# Patient Record
Sex: MALE | Race: WHITE | Employment: UNEMPLOYED | URBAN - METROPOLITAN AREA
[De-identification: names, ages, dates, MRNs, and addresses within clinical notes are randomized per-mention and may not be internally consistent; named-entity substitution may affect disease eponyms.]

---

## 2018-08-22 NOTE — PROGRESS NOTES
SUBJECTIVE:   Giovany Garrett is a 27 year old male who presents to clinic today for the following health issues:    Patient here for refill of Remeron 15mg, given per treatment center. No record of medication in reconciliation.  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 Been clean for 50 days.   History of anxiety. See's counseling currently. They recommend that he get back on Remeron for sleep and anxiety. Was helping.     Problem list and histories reviewed & adjusted, as indicated.  Additional history: as documented    Patient Active Problem List   Diagnosis     Seasonal allergies     Anxiety     Past Surgical History:   Procedure Laterality Date     HERNIA REPAIR  2007       Social History   Substance Use Topics     Smoking status: Former Smoker     Types: Cigarettes     Smokeless tobacco: Never Used     Alcohol use No     Family History   Problem Relation Age of Onset     Substance Abuse Father          Current Outpatient Prescriptions   Medication Sig Dispense Refill     mirtazapine (REMERON) 15 MG tablet Take 1 tablet (15 mg) by mouth At Bedtime 90 tablet 1     cetirizine (ZYRTEC) 10 MG tablet Take 1 tablet by mouth every evening. (Patient not taking: Reported on 8/23/2018) 30 tablet 1     ibuprofen (ADVIL,MOTRIN) 200 MG tablet Take 200-400 mg by mouth every 6 hours as needed.       oxyCODONE (ROXICODONE) 15 MG immediate release tablet Take 1 tablet (15 mg) by mouth every 6 hours as needed for pain (Patient not taking: Reported on 8/23/2018) 20 tablet 0     No Known Allergies  BP Readings from Last 3 Encounters:   08/23/18 116/70   11/20/16 129/86   11/17/16 128/78    Wt Readings from Last 3 Encounters:   08/23/18 205 lb (93 kg)   05/24/12 150 lb (68 kg)                    Reviewed and updated as needed this visit by clinical staff  Tobacco  Allergies  Meds       Reviewed and updated as needed this visit by Provider         ROS:  Constitutional, HEENT, cardiovascular, pulmonary, gi and gu systems are  negative, except as otherwise noted.    OBJECTIVE:     /70  Pulse 75  Temp 98.2  F (36.8  C) (Oral)  Wt 205 lb (93 kg)  SpO2 98%  BMI 29.25 kg/m2  Body mass index is 29.25 kg/(m^2).  GENERAL: healthy, alert and no distress  PSYCH: mentation appears normal, affect normal/bright    Diagnostic Test Results:  none     ASSESSMENT/PLAN:          ICD-10-CM    1. Anxiety F41.9 mirtazapine (REMERON) 15 MG tablet     MENTAL HEALTH REFERRAL  - Adult; Psychiatry and Medication Management; Psychiatry; Other: [facility] [phone]; We will contact you to schedule the appointment or please call with any questions   2. History of drug abuse Z87.898 MENTAL HEALTH REFERRAL  - Adult; Psychiatry and Medication Management; Psychiatry; Other: [facility] [phone]; We will contact you to schedule the appointment or please call with any questions   ok for refills  Cont counseling  Recommend follow up with psychiatry   Follow up  6 months    Dwain Ann PA-C  [facility]

## 2018-08-23 ENCOUNTER — OFFICE VISIT (OUTPATIENT)
Dept: FAMILY MEDICINE | Facility: CLINIC | Age: 28
End: 2018-08-23
Payer: MEDICAID

## 2018-08-23 VITALS
BODY MASS INDEX: 29.25 KG/M2 | OXYGEN SATURATION: 98 % | HEART RATE: 75 BPM | SYSTOLIC BLOOD PRESSURE: 116 MMHG | DIASTOLIC BLOOD PRESSURE: 70 MMHG | TEMPERATURE: 98.2 F | WEIGHT: 205 LBS

## 2018-08-23 DIAGNOSIS — F19.11 HISTORY OF DRUG ABUSE (H): ICD-10-CM

## 2018-08-23 DIAGNOSIS — F41.9 ANXIETY: Primary | ICD-10-CM

## 2018-08-23 PROCEDURE — 99203 OFFICE O/P NEW LOW 30 MIN: CPT | Performed by: PHYSICIAN ASSISTANT

## 2018-08-23 RX ORDER — MIRTAZAPINE 15 MG/1
15 TABLET, FILM COATED ORAL AT BEDTIME
Qty: 90 TABLET | Refills: 1 | Status: SHIPPED | OUTPATIENT
Start: 2018-08-23 | End: 2022-02-16

## 2018-08-23 ASSESSMENT — ANXIETY QUESTIONNAIRES
GAD7 TOTAL SCORE: 7
3. WORRYING TOO MUCH ABOUT DIFFERENT THINGS: SEVERAL DAYS
7. FEELING AFRAID AS IF SOMETHING AWFUL MIGHT HAPPEN: NOT AT ALL
6. BECOMING EASILY ANNOYED OR IRRITABLE: SEVERAL DAYS
2. NOT BEING ABLE TO STOP OR CONTROL WORRYING: SEVERAL DAYS
5. BEING SO RESTLESS THAT IT IS HARD TO SIT STILL: MORE THAN HALF THE DAYS
1. FEELING NERVOUS, ANXIOUS, OR ON EDGE: SEVERAL DAYS

## 2018-08-23 ASSESSMENT — PATIENT HEALTH QUESTIONNAIRE - PHQ9: 5. POOR APPETITE OR OVEREATING: SEVERAL DAYS

## 2018-08-23 NOTE — MR AVS SNAPSHOT
After Visit Summary   8/23/2018    Giovany Garrett    MRN: [id number]           Patient Information     Date Of Birth          1990        Visit Information        Provider Department      8/23/2018 7:20 AM Dwain Ann PA-C [facility]        Today's Diagnoses     Anxiety    -  1    History of drug abuse           Follow-ups after your visit        Additional Services     MENTAL HEALTH REFERRAL  - Adult; Psychiatry and Medication Management; Psychiatry; Other: [facility] [phone]; We will contact you to schedule the appointment or please call with any questions       All scheduling is subject to the client's specific insurance plan & benefits, provider/location availability, and provider clinical specialities.  Please arrive 15 minutes early for your first appointment and bring your completed paperwork.    Please be aware that coverage of these services is subject to the terms and limitations of your health insurance plan.  Call member services at your health plan with any benefit or coverage questions.                            Follow-up notes from your care team     Return in about 6 months (around 2/23/2019).      Who to contact     If you have questions or need follow up information about today's clinic visit or your schedule please contact [facility] directly at [phone].  Normal or non-critical lab and imaging results will be communicated to you by MyChart, letter or phone within 4 business days after the clinic has received the results. If you do not hear from us within 7 days, please contact the clinic through MyChart or phone. If you have a critical or abnormal lab result, we will notify you by phone as soon as possible.  Submit refill requests through [employer] or call your pharmacy and they will forward the refill request to us. Please allow 3 business days for your refill to be completed.          Additional Information  About Your Visit        Care EveryWhere ID     This is your Care EveryWhere ID. This could be used by other organizations to access your [place] medical records  [id number]        Your Vitals Were     Pulse Temperature Pulse Oximetry BMI (Body Mass Index)          75 98.2  F (36.8  C) (Oral) 98% 29.25 kg/m2         Blood Pressure from Last 3 Encounters:   08/23/18 116/70   11/20/16 129/86   11/17/16 128/78    Weight from Last 3 Encounters:   08/23/18 205 lb (93 kg)   05/24/12 150 lb (68 kg)              We Performed the Following     MENTAL HEALTH REFERRAL  - Adult; Psychiatry and Medication Management; Psychiatry; Other: [facility] [phone]; We will contact you to schedule the appointment or please call with any questions          Today's Medication Changes          These changes are accurate as of 8/23/18  7:29 AM.  If you have any questions, ask your nurse or doctor.               Start taking these medicines.        Dose/Directions    mirtazapine 15 MG tablet   Commonly known as:  REMERON   Used for:  Anxiety   Started by:  Dwain Ann PA-C        Dose:  15 mg   Take 1 tablet (15 mg) by mouth At Bedtime   Quantity:  90 tablet   Refills:  1            Where to get your medicines      Some of these will need a paper prescription and others can be bought over the counter.  Ask your nurse if you have questions.     Bring a paper prescription for each of these medications     mirtazapine 15 MG tablet                Primary Care Provider Office Phone # Fax #    [facility] [phone] [id number] [facility] [address]        Equal Access to Services     MIGEL RAMIREZ AH: Hadelijah noguera Sokannan, waaxda luqadaha, qaybta kaalmada elidia jennings. So [facility] [phone].    ATENCIÓN: Si habla español, tiene a mcpherson disposición servicios gratuitos de asistencia lingüística. Llame al [phone].    We comply  with applicable federal civil rights laws and [place] laws. We do not discriminate on the basis of race, color, national origin, age, disability, sex, sexual orientation, or gender identity.            Thank you!     Thank you for choosing [facility]  [facility]. Our goal is always to provide you with excellent care. Hearing back from our patients is one way we can continue to improve our services. Please take a few minutes to complete the written survey that you may receive in the mail after your visit with us. Thank you!             Your Updated Medication List - Protect others around you: Learn how to safely use, store and throw away your medicines at www.disposemymeds.org.          This list is accurate as of 8/23/18  7:29 AM.  Always use your most recent med list.                   Brand Name Dispense Instructions for use Diagnosis    cetirizine 10 MG tablet    zyrTEC    30 tablet    Take 1 tablet by mouth every evening.    Seasonal allergies       ibuprofen 200 MG tablet    ADVIL/MOTRIN     Take 200-400 mg by mouth every 6 hours as needed.        mirtazapine 15 MG tablet    REMERON    90 tablet    Take 1 tablet (15 mg) by mouth At Bedtime    Anxiety       oxyCODONE IR 15 MG tablet    ROXICODONE    20 tablet    Take 1 tablet (15 mg) by mouth every 6 hours as needed for pain

## 2018-08-24 ASSESSMENT — PATIENT HEALTH QUESTIONNAIRE - PHQ9: SUM OF ALL RESPONSES TO PHQ QUESTIONS 1-9: 9

## 2018-08-24 ASSESSMENT — ANXIETY QUESTIONNAIRES: GAD7 TOTAL SCORE: 7

## 2018-09-24 NOTE — PROGRESS NOTES
SUBJECTIVE:   CC: Giovany Garrett is an 27 year old male who presents for preventative health visit.     Answers for HPI/ROS submitted by the patient on 9/25/2018   Annual Exam:  Getting at least 3 servings of Calcium per day:: Yes  Bi-annual eye exam:: NO  Dental care twice a year:: NO  Sleep apnea or symptoms of sleep apnea:: None  Diet:: Regular (no restrictions)  Frequency of exercise:: 2-3 days/week  Positive for the following: rash  Negative for the following: abdominal pain, Blood in stool, Blood in urine, chest pain, chills, congestion, constipation, cough, diarrhea, dizziness, ear pain, eye pain, nervous/anxious, fever, frequency, genital sores, headaches, hearing loss, heartburn, arthralgias, joint swelling, peripheral edema, mood changes, myalgias, nausea, dysuria, palpitations, Skin sensation changes, sore throat, urgency, shortness of breath, visual disturbance, weakness  impotence: No  penile discharge: No  Taking medications regularly:: Yes  Medication side effects:: None  Additional concerns today:: No  PHQ-2 Score: 1  Duration of exercise:: 30-45 minutes      Rash      Duration: 2 weeks    Description  Location: Groin Area  Itching: mild    Intensity:  mild    Accompanying signs and symptoms: None    History (similar episodes/previous evaluation): None    Precipitating or alleviating factors:  New exposures:  Uses the transformation house laundry detergent   Recent travel: no      Therapies tried and outcome: none    Also infertility questions. He and his fiance haven't been able to get pregnant for over 6 months. She has 2 kids. He is interested in getting any testing done to evaluate this problem.      Today's PHQ-2 Score:   PHQ-2 ( 1999 Pfizer) 9/25/2018 5/30/2012   Q1: Little interest or pleasure in doing things 0 2   Q2: Feeling down, depressed or hopeless 1 2   PHQ-2 Score 1 4   Q1: Little interest or pleasure in doing things Not at all -   Q2: Feeling down, depressed or hopeless Several days -    PHQ-2 Score 1 -       Abuse: Current or Past(Physical, Sexual or Emotional)- No  Do you feel safe in your environment - Yes    Social History   Substance Use Topics     Smoking status: Former Smoker     Types: Cigarettes     Smokeless tobacco: Never Used     Alcohol use No      If you drink alcohol do you typically have >3 drinks per day or >7 drinks per week? No                      Last PSA: No results found for: PSA    Reviewed orders with patient. Reviewed health maintenance and updated orders accordingly - Yes  Labs reviewed in EPIC  BP Readings from Last 3 Encounters:   09/25/18 127/77   08/23/18 116/70   11/20/16 129/86    Wt Readings from Last 3 Encounters:   09/25/18 208 lb (94.3 kg)   08/23/18 205 lb (93 kg)   05/24/12 150 lb (68 kg)                  Patient Active Problem List   Diagnosis     Seasonal allergies     Anxiety     Past Surgical History:   Procedure Laterality Date     HERNIA REPAIR  2007       Social History   Substance Use Topics     Smoking status: Former Smoker     Types: Cigarettes     Smokeless tobacco: Never Used     Alcohol use No     Family History   Problem Relation Age of Onset     Substance Abuse Father          Current Outpatient Prescriptions   Medication Sig Dispense Refill     clotrimazole (LOTRIMIN) 1 % cream Apply topically 2 times daily 15 g 1     ibuprofen (ADVIL,MOTRIN) 200 MG tablet Take 200-400 mg by mouth every 6 hours as needed.       mirtazapine (REMERON) 15 MG tablet Take 1 tablet (15 mg) by mouth At Bedtime 90 tablet 1     No Known Allergies    Reviewed and updated as needed this visit by clinical staff  Tobacco  Allergies  Meds  Problems  Med Hx  Surg Hx  Fam Hx  Soc Hx          Reviewed and updated as needed this visit by Provider  Allergies  Meds  Problems          Past Medical History:   Diagnosis Date     Leaky heart valve       Past Surgical History:   Procedure Laterality Date     HERNIA REPAIR  2007       ROS:  CONSTITUTIONAL: NEGATIVE for fever,  "chills, change in weight  INTEGUMENTARY/SKIN: NEGATIVE for worrisome rashes, moles or lesions  EYES: NEGATIVE for vision changes or irritation  ENT: NEGATIVE for ear, mouth and throat problems  RESP: NEGATIVE for significant cough or SOB  CV: NEGATIVE for chest pain, palpitations or peripheral edema  GI: NEGATIVE for nausea, abdominal pain, heartburn, or change in bowel habits   male: negative for dysuria, hematuria, decreased urinary stream, erectile dysfunction, urethral discharge  MUSCULOSKELETAL: NEGATIVE for significant arthralgias or myalgia  NEURO: NEGATIVE for weakness, dizziness or paresthesias  PSYCHIATRIC: NEGATIVE for changes in mood or affect    OBJECTIVE:   /77  Pulse 85  Temp 97.7  F (36.5  C) (Oral)  Ht 5' 11\" (1.803 m)  Wt 208 lb (94.3 kg)  SpO2 100%  BMI 29.01 kg/m2  EXAM:  GENERAL: healthy, alert and no distress  EYES: Eyes grossly normal to inspection, PERRL and conjunctivae and sclerae normal  HENT: ear canals and TM's normal, nose and mouth without ulcers or lesions  NECK: no adenopathy, no asymmetry, masses, or scars and thyroid normal to palpation  RESP: lungs clear to auscultation - no rales, rhonchi or wheezes  CV: regular rate and rhythm, normal S1 S2, no S3 or S4, no murmur, click or rub, no peripheral edema and peripheral pulses strong  ABDOMEN: soft, nontender, no hepatosplenomegaly, no masses and bowel sounds normal   (male): normal male genitalia without lesions or urethral discharge, no hernia  MS: no gross musculoskeletal defects noted, no edema  SKIN: 4cm circular dry erythmatous patch right inner superior thigh.  NEURO: Normal strength and tone, mentation intact and speech normal  PSYCH: mentation appears normal, affect normal/bright    Diagnostic Test Results:  Results for orders placed or performed in visit on 09/25/18 (from the past 24 hour(s))   KOH skin hair or nails   Result Value Ref Range    Specimen Description Thigh Right     KOH Skin Hair Nails Test " "Fungal elements seen (A)        ASSESSMENT/PLAN:       ICD-10-CM    1. Routine general medical examination at a health care facility Z00.00 Lipid panel reflex to direct LDL Fasting     Basic metabolic panel   2. Rash R21 KOH skin hair or nails   3. Tinea corporis B35.4 clotrimazole (LOTRIMIN) 1 % cream   4. Fertility testing Z31.41 Semen Analysis, Strict Morphology (KWESI)   5. Need for prophylactic vaccination and inoculation against influenza Z23 FLU VACCINE, SPLIT VIRUS, IM (QUADRIVALENT) [95398]- >3 YRS     Vaccine Administration, Initial [11804]   1. Work on Healthy diet and exercise. Getting heart rate elevated for 30 mins most days of week. Laps pending  2-3 start clotrimazole twice a day for 4 wks. See Patient Instructions   4. Future lab order placed.     COUNSELING:  Reviewed preventive health counseling, as reflected in patient instructions       Regular exercise       Healthy diet/nutrition    BP Readings from Last 1 Encounters:   09/25/18 127/77     Estimated body mass index is 29.01 kg/(m^2) as calculated from the following:    Height as of this encounter: 5' 11\" (1.803 m).    Weight as of this encounter: 208 lb (94.3 kg).      Weight management plan: Discussed healthy diet and exercise guidelines and patient will follow up in 12 months in clinic to re-evaluate.     reports that he has quit smoking. His smoking use included Cigarettes. He has never used smokeless tobacco.      Counseling Resources:  ATP IV Guidelines  Pooled Cohorts Equation Calculator  FRAX Risk Assessment  ICSI Preventive Guidelines  Dietary Guidelines for Americans, 2010  USDA's MyPlate  ASA Prophylaxis  Lung CA Screening    Dwain Ann PA-C  [facility]  "

## 2018-09-24 NOTE — PATIENT INSTRUCTIONS
Preventive Health Recommendations  Male Ages 26 - 39    Yearly exam:             See your health care provider every year in order to  o   Review health changes.   o   Discuss preventive care.    o   Review your medicines if your doctor has prescribed any.    You should be tested each year for STDs (sexually transmitted diseases), if you re at risk.     After age 35, talk to your provider about cholesterol testing. If you are at risk for heart disease, have your cholesterol tested at least every 5 years.     If you are at risk for diabetes, you should have a diabetes test (fasting glucose).  Shots: Get a flu shot each year. Get a tetanus shot every 10 years.     Nutrition:    Eat at least 5 servings of fruits and vegetables daily.     Eat whole-grain bread, whole-wheat pasta and brown rice instead of white grains and rice.     Get adequate Calcium and Vitamin D.     Lifestyle    Exercise for at least 150 minutes a week (30 minutes a day, 5 days a week). This will help you control your weight and prevent disease.     Limit alcohol to one drink per day.     No smoking.     Wear sunscreen to prevent skin cancer.     See your dentist every six months for an exam and cleaning.                    Ringworm (Tinea Corporis)  What is ringworm?   Ringworm is a fungus infection of the skin. It has nothing to do with worms. People often get it from puppies or kittens who have ringworm. It can also be passed from person to person following close contact such as wrestling.  If your child has ringworm, your child will have a ring-shaped pink patch on the skin. The patch will:  Usually be 1/2 to 1 inch in size with a scaly, raised border and clear center.   Get slowly bigger.   Be mildly itchy.  How long does it last?   With the appropriate treatment, ringworm should go away in 2 weeks.  How can I take care of my child?   Antifungal creamBuy Tinactin, Micatin, or Lotrimin cream at your drugstore. You won't need a prescription.  "Apply the cream twice a day to the rash and 1 inch beyond its borders. Continue this treatment for 1 week after the ringworm patch is smooth and seems to be gone. Encourage your child to avoid scratching the area.   ContagiousnessRingworm of the skin is mildly contagious. It requires direct skin-to-skin contact. After 48 hours of treatment, ringworm is not contagious at all. Your child doesn't have to miss any school or day care. The type of ringworm you get from pets is not spread from human to human, only from animal to human.   Treatment of petsKittens and puppies with ringworm usually do not itch and may not have any rash. Pets with a skin rash or sores should be examined by a . Also have your child avoid close contact with the animal until he is treated. Natural immunity develops in animals after 4 months even without treatment. Call your  for other questions.  When should I call my child's healthcare provider?  Call during office hours if:  The ringworm continues to spread after 1 week of treatment.   The rash has not cleared up in 4 weeks.   You have other concerns or questions.    Published by [employer].  This content is reviewed periodically and is subject to change as new health information becomes available. The information is intended to inform and educate and is not a replacement for medical evaluation, advice, diagnosis or treatment by a healthcare professional.  Written by JULIÁN Fabian MD, author of \"Your Child's Health,\" [employer].    2011 [employer] and/or its affiliates. All rights reserved.               "

## 2018-09-25 ENCOUNTER — OFFICE VISIT (OUTPATIENT)
Dept: FAMILY MEDICINE | Facility: CLINIC | Age: 28
End: 2018-09-25
Payer: COMMERCIAL

## 2018-09-25 VITALS
DIASTOLIC BLOOD PRESSURE: 77 MMHG | SYSTOLIC BLOOD PRESSURE: 127 MMHG | TEMPERATURE: 97.7 F | BODY MASS INDEX: 29.12 KG/M2 | WEIGHT: 208 LBS | HEART RATE: 85 BPM | HEIGHT: 71 IN | OXYGEN SATURATION: 100 %

## 2018-09-25 DIAGNOSIS — Z31.41 FERTILITY TESTING: ICD-10-CM

## 2018-09-25 DIAGNOSIS — B35.4 TINEA CORPORIS: ICD-10-CM

## 2018-09-25 DIAGNOSIS — Z00.00 ROUTINE GENERAL MEDICAL EXAMINATION AT A HEALTH CARE FACILITY: Primary | ICD-10-CM

## 2018-09-25 DIAGNOSIS — R21 RASH: ICD-10-CM

## 2018-09-25 DIAGNOSIS — Z23 NEED FOR PROPHYLACTIC VACCINATION AND INOCULATION AGAINST INFLUENZA: ICD-10-CM

## 2018-09-25 LAB
ANION GAP SERPL CALCULATED.3IONS-SCNC: 8 MMOL/L (ref 3–14)
BUN SERPL-MCNC: 17 MG/DL (ref 7–30)
CALCIUM SERPL-MCNC: 8.7 MG/DL (ref 8.5–10.1)
CHLORIDE SERPL-SCNC: 104 MMOL/L (ref 94–109)
CHOLEST SERPL-MCNC: 173 MG/DL
CO2 SERPL-SCNC: 26 MMOL/L (ref 20–32)
CREAT SERPL-MCNC: 0.87 MG/DL (ref 0.66–1.25)
GFR SERPL CREATININE-BSD FRML MDRD: >90 ML/MIN/1.7M2
GLUCOSE SERPL-MCNC: 86 MG/DL (ref 70–99)
HDLC SERPL-MCNC: 40 MG/DL
KOH PREP SPEC: ABNORMAL
LDLC SERPL CALC-MCNC: 116 MG/DL
NONHDLC SERPL-MCNC: 133 MG/DL
POTASSIUM SERPL-SCNC: 4.3 MMOL/L (ref 3.4–5.3)
SODIUM SERPL-SCNC: 138 MMOL/L (ref 133–144)
SPECIMEN SOURCE: ABNORMAL
TRIGL SERPL-MCNC: 83 MG/DL

## 2018-09-25 PROCEDURE — 99213 OFFICE O/P EST LOW 20 MIN: CPT | Mod: 25 | Performed by: PHYSICIAN ASSISTANT

## 2018-09-25 PROCEDURE — 80061 LIPID PANEL: CPT | Performed by: PHYSICIAN ASSISTANT

## 2018-09-25 PROCEDURE — 36415 COLL VENOUS BLD VENIPUNCTURE: CPT | Performed by: PHYSICIAN ASSISTANT

## 2018-09-25 PROCEDURE — 87220 TISSUE EXAM FOR FUNGI: CPT | Performed by: PHYSICIAN ASSISTANT

## 2018-09-25 PROCEDURE — 99395 PREV VISIT EST AGE 18-39: CPT | Mod: 25 | Performed by: PHYSICIAN ASSISTANT

## 2018-09-25 PROCEDURE — 90471 IMMUNIZATION ADMIN: CPT | Performed by: PHYSICIAN ASSISTANT

## 2018-09-25 PROCEDURE — 90686 IIV4 VACC NO PRSV 0.5 ML IM: CPT | Performed by: PHYSICIAN ASSISTANT

## 2018-09-25 PROCEDURE — 80048 BASIC METABOLIC PNL TOTAL CA: CPT | Performed by: PHYSICIAN ASSISTANT

## 2018-09-25 RX ORDER — CLOTRIMAZOLE 1 %
CREAM (GRAM) TOPICAL 2 TIMES DAILY
Qty: 15 G | Refills: 1 | Status: SHIPPED | OUTPATIENT
Start: 2018-09-25 | End: 2022-02-16

## 2018-09-25 ASSESSMENT — ENCOUNTER SYMPTOMS
ARTHRALGIAS: 0
COUGH: 0
CONSTIPATION: 0
DYSURIA: 0
DIARRHEA: 0
FEVER: 0
PALPITATIONS: 0
MYALGIAS: 0
SORE THROAT: 0
CHILLS: 0
ABDOMINAL PAIN: 0
DIZZINESS: 0
NAUSEA: 0
WEAKNESS: 0
HEMATOCHEZIA: 0
PARESTHESIAS: 0
HEADACHES: 0
JOINT SWELLING: 0
EYE PAIN: 0
FREQUENCY: 0
SHORTNESS OF BREATH: 0
NERVOUS/ANXIOUS: 0
HEMATURIA: 0
HEARTBURN: 0

## 2018-09-25 NOTE — MR AVS SNAPSHOT
After Visit Summary   9/25/2018    Giovany Garrett    MRN: [id number]           Patient Information     Date Of Birth          1990        Visit Information        Provider Department      9/25/2018 11:20 AM Dwain Ann PA-C [facility]        Today's Diagnoses     Routine general medical examination at a health care facility    -  1    Rash        Fertility testing        Tinea corporis          Care Instructions      Preventive Health Recommendations  Male Ages 26 - 39    Yearly exam:             See your health care provider every year in order to  o   Review health changes.   o   Discuss preventive care.    o   Review your medicines if your doctor has prescribed any.    You should be tested each year for STDs (sexually transmitted diseases), if you re at risk.     After age 35, talk to your provider about cholesterol testing. If you are at risk for heart disease, have your cholesterol tested at least every 5 years.     If you are at risk for diabetes, you should have a diabetes test (fasting glucose).  Shots: Get a flu shot each year. Get a tetanus shot every 10 years.     Nutrition:    Eat at least 5 servings of fruits and vegetables daily.     Eat whole-grain bread, whole-wheat pasta and brown rice instead of white grains and rice.     Get adequate Calcium and Vitamin D.     Lifestyle    Exercise for at least 150 minutes a week (30 minutes a day, 5 days a week). This will help you control your weight and prevent disease.     Limit alcohol to one drink per day.     No smoking.     Wear sunscreen to prevent skin cancer.     See your dentist every six months for an exam and cleaning.                    Ringworm (Tinea Corporis)  What is ringworm?   Ringworm is a fungus infection of the skin. It has nothing to do with worms. People often get it from puppies or kittens who have ringworm. It can also be passed from person to person following close contact such as wrestling.  If  "your child has ringworm, your child will have a ring-shaped pink patch on the skin. The patch will:  Usually be 1/2 to 1 inch in size with a scaly, raised border and clear center.   Get slowly bigger.   Be mildly itchy.  How long does it last?   With the appropriate treatment, ringworm should go away in 2 weeks.  How can I take care of my child?   Antifungal creamBuy Tinactin, Micatin, or Lotrimin cream at your drugstore. You won't need a prescription. Apply the cream twice a day to the rash and 1 inch beyond its borders. Continue this treatment for 1 week after the ringworm patch is smooth and seems to be gone. Encourage your child to avoid scratching the area.   ContagiousnessRingworm of the skin is mildly contagious. It requires direct skin-to-skin contact. After 48 hours of treatment, ringworm is not contagious at all. Your child doesn't have to miss any school or day care. The type of ringworm you get from pets is not spread from human to human, only from animal to human.   Treatment of petsKittens and puppies with ringworm usually do not itch and may not have any rash. Pets with a skin rash or sores should be examined by a . Also have your child avoid close contact with the animal until he is treated. Natural immunity develops in animals after 4 months even without treatment. Call your  for other questions.  When should I call my child's healthcare provider?  Call during office hours if:  The ringworm continues to spread after 1 week of treatment.   The rash has not cleared up in 4 weeks.   You have other concerns or questions.    Published by [employer].  This content is reviewed periodically and is subject to change as new health information becomes available. The information is intended to inform and educate and is not a replacement for medical evaluation, advice, diagnosis or treatment by a healthcare professional.  Written by JULIÁN Fabian MD, author of \"Your Child's Health,\" " "[employer].    2011 [facility] and/or its affiliates. All rights reserved.                       Follow-ups after your visit        Your next 10 appointments already scheduled     Sep 25, 2018 11:20 AM CDT   PHYS[facility] with Dwain Ann PA-C   [facility]    [address] [facility] [address] [phone]              Future tests that were ordered for you today     Open Future Orders        Priority Expected Expires Ordered    Semen Analysis, Strict Morphology (KWESI) Routine  9/25/2019 9/25/2018            Who to contact     If you have questions or need follow up information about today's clinic visit or your schedule please contact [facility] directly at [phone].  Normal or non-critical lab and imaging results will be communicated to you by MyChart, letter or phone within 4 business days after the clinic has received the results. If you do not hear from us within 7 days, please contact the clinic through MyChart or phone. If you have a critical or abnormal lab result, we will notify you by phone as soon as possible.  Submit refill requests through [employer] or call your pharmacy and they will forward the refill request to us. Please allow 3 business days for your refill to be completed.          Additional Information About Your Visit        Care EveryWhere ID     This is your Care EveryWhere ID. This could be used by other organizations to access your [facility] medical records  [id number]        Your Vitals Were     Pulse Temperature Height Pulse Oximetry BMI (Body Mass Index)       85 97.7  F (36.5  C) (Oral) 5' 11\" (1.803 m) 100% 29.01 kg/m2        Blood Pressure from Last 3 Encounters:   09/25/18 127/77   08/23/18 116/70   11/20/16 129/86    Weight from Last 3 Encounters:   09/25/18 208 lb (94.3 kg)   08/23/18 205 lb (93 kg)   05/24/12 150 lb (68 kg)              We Performed the Following     Basic metabolic panel     KOH skin hair " or nails     Lipid panel reflex to direct LDL Fasting          Today's Medication Changes          These changes are accurate as of 9/25/18 11:17 AM.  If you have any questions, ask your nurse or doctor.               Start taking these medicines.        Dose/Directions    clotrimazole 1 % cream   Commonly known as:  LOTRIMIN   Used for:  Tinea corporis   Started by:  Dwain Ann PA-C        Apply topically 2 times daily   Quantity:  15 g   Refills:  1            Where to get your medicines      These medications [facility] to [facility] [address], [address]  [address] [address], [facility] [address]     Phone:  [phone]     clotrimazole 1 % cream                Primary Care Provider Office Phone # Fax #    [facility] [phone] [address] [facility] [address]        Equal Access to Services     FER RAMIREZ : Hadii niraj ku hadasho Soomaali, waaxda luqadaha, qaybta kaalmada adeegyada, waxay louin hayaan abe cruz . So [facility] [phone].    ATENCIÓN: Si habla español, tiene a mcpherson disposición servicios gratuitos de asistencia lingüística. Llame al [phone].    We comply with applicable federal civil rights laws and [place] laws. We do not discriminate on the basis of race, color, national origin, age, disability, sex, sexual orientation, or gender identity.            Thank you!     Thank you for choosing [facility]  for your care. Our goal is always to provide you with excellent care. Hearing back from our patients is one way we can continue to improve our services. Please take a few minutes to complete the written survey that you may receive in the mail after your visit with us. Thank you!             Your Updated Medication List - Protect others around you: Learn how to safely use, store and throw away your medicines at www.disposemymeds.org.          This list is accurate as of 9/25/18  11:17 AM.  Always use your most recent med list.                   Brand Name Dispense Instructions for use Diagnosis    clotrimazole 1 % cream    LOTRIMIN    15 g    Apply topically 2 times daily    Tinea corporis       ibuprofen 200 MG tablet    ADVIL/MOTRIN     Take 200-400 mg by mouth every 6 hours as needed.        mirtazapine 15 MG tablet    REMERON    90 tablet    Take 1 tablet (15 mg) by mouth At Bedtime    Anxiety

## 2018-09-25 NOTE — LETTER
September 26, 2018    Giovany Garrett  [address] [facility] [address]            Mr. Garrett,     All of your labs were normal for you.     Please contact the clinic if you have additional questions.  Thank you.     Sincerely,     Dwain Ann PA-C/michael    Results for orders placed or performed in visit on 09/25/18   Lipid panel reflex to direct LDL Fasting   Result Value Ref Range    Cholesterol 173 <200 mg/dL    Triglycerides 83 <150 mg/dL    HDL Cholesterol 40 >39 mg/dL    LDL Cholesterol Calculated 116 (H) <100 mg/dL    Non HDL Cholesterol 133 (H) <130 mg/dL   Basic metabolic panel   Result Value Ref Range    Sodium 138 133 - 144 mmol/L    Potassium 4.3 3.4 - 5.3 mmol/L    Chloride 104 94 - 109 mmol/L    Carbon Dioxide 26 20 - 32 mmol/L    Anion Gap 8 3 - 14 mmol/L    Glucose 86 70 - 99 mg/dL    Urea Nitrogen 17 7 - 30 mg/dL    Creatinine 0.87 0.66 - 1.25 mg/dL    GFR Estimate >90 >60 mL/min/1.7m2    GFR Estimate If Black >90 >60 mL/min/1.7m2    Calcium 8.7 8.5 - 10.1 mg/dL   KOH skin hair or nails   Result Value Ref Range    Specimen Description Thigh Right     KOH Skin Hair Nails Test Fungal elements seen (A)

## 2018-09-25 NOTE — PROGRESS NOTES

## 2018-09-26 NOTE — PROGRESS NOTES
MrYuval Garrett,    All of your labs were normal for you.    Please contact the clinic if you have additional questions.  Thank you.    Sincerely,    Dwain Ann PA-C

## 2019-04-25 ENCOUNTER — HOSPITAL ENCOUNTER (EMERGENCY)
Facility: CLINIC | Age: 29
Discharge: HOME | End: 2019-04-25
Attending: PHYSICIAN ASSISTANT | Admitting: PHYSICIAN ASSISTANT
Payer: COMMERCIAL

## 2019-04-25 VITALS
SYSTOLIC BLOOD PRESSURE: 147 MMHG | OXYGEN SATURATION: 98 % | TEMPERATURE: 98 F | DIASTOLIC BLOOD PRESSURE: 80 MMHG | HEART RATE: 100 BPM

## 2019-04-25 DIAGNOSIS — H60.392 OTHER INFECTIVE ACUTE OTITIS EXTERNA OF LEFT EAR: ICD-10-CM

## 2019-04-25 PROCEDURE — G0463 HOSPITAL OUTPT CLINIC VISIT: HCPCS

## 2019-04-25 PROCEDURE — 99213 OFFICE O/P EST LOW 20 MIN: CPT | Mod: Z6 | Performed by: PHYSICIAN ASSISTANT

## 2019-04-25 RX ORDER — CIPROFLOXACIN AND DEXAMETHASONE 3; 1 MG/ML; MG/ML
4 SUSPENSION/ DROPS AURICULAR (OTIC) 2 TIMES DAILY
Qty: 7.5 ML | Refills: 0 | Status: SHIPPED | OUTPATIENT
Start: 2019-04-25 | End: 2022-02-16

## 2019-04-25 RX ORDER — AMOXICILLIN 875 MG
875 TABLET ORAL 2 TIMES DAILY
Qty: 20 TABLET | Refills: 0 | Status: SHIPPED | OUTPATIENT
Start: 2019-04-25 | End: 2019-05-05

## 2019-04-25 NOTE — ED AVS SNAPSHOT
[facility]  [address] [address]  Phone:  [phone]  Fax:  [phone]                                    Giovany Garrett   MRN: [id number]    Department:  [facility] Department   Date of Visit:  4/25/2019           After Visit Summary Signature Page    I have received my discharge instructions, and my questions have been answered. I have discussed any challenges I see with this plan with the nurse or doctor.    ..........................................................................................................................................  Patient/Patient Representative Signature      ..........................................................................................................................................  Patient Representative Print Name and Relationship to Patient    ..................................................               ................................................  Date                                   Time    ..........................................................................................................................................  Reviewed by Signature/Title    ...................................................              ..............................................  Date                                               Time          22EPIC Rev 08/18

## 2021-12-23 ENCOUNTER — HOSPITAL ENCOUNTER (OUTPATIENT)
Dept: BEHAVIORAL HEALTH | Facility: CLINIC | Age: 31
Discharge: HOME | End: 2021-12-23
Attending: FAMILY MEDICINE | Admitting: FAMILY MEDICINE
Payer: COMMERCIAL

## 2021-12-23 VITALS — HEIGHT: 72 IN | BODY MASS INDEX: 20.32 KG/M2 | WEIGHT: 150 LBS

## 2021-12-23 DIAGNOSIS — F15.20 AMPHETAMINE USE DISORDER, SEVERE, DEPENDENCE (H): ICD-10-CM

## 2021-12-23 PROCEDURE — H0001 ALCOHOL AND/OR DRUG ASSESS: HCPCS | Mod: TEL,95

## 2021-12-23 ASSESSMENT — ANXIETY QUESTIONNAIRES
7. FEELING AFRAID AS IF SOMETHING AWFUL MIGHT HAPPEN: MORE THAN HALF THE DAYS
GAD7 TOTAL SCORE: 14
IF YOU CHECKED OFF ANY PROBLEMS ON THIS QUESTIONNAIRE, HOW DIFFICULT HAVE THESE PROBLEMS MADE IT FOR YOU TO DO YOUR WORK, TAKE CARE OF THINGS AT HOME, OR GET ALONG WITH OTHER PEOPLE: SOMEWHAT DIFFICULT
3. WORRYING TOO MUCH ABOUT DIFFERENT THINGS: MORE THAN HALF THE DAYS
4. TROUBLE RELAXING: MORE THAN HALF THE DAYS
5. BEING SO RESTLESS THAT IT IS HARD TO SIT STILL: SEVERAL DAYS
6. BECOMING EASILY ANNOYED OR IRRITABLE: MORE THAN HALF THE DAYS
2. NOT BEING ABLE TO STOP OR CONTROL WORRYING: MORE THAN HALF THE DAYS
1. FEELING NERVOUS, ANXIOUS, OR ON EDGE: NEARLY EVERY DAY

## 2021-12-23 ASSESSMENT — PAIN SCALES - GENERAL: PAINLEVEL: NO PAIN (0)

## 2021-12-23 ASSESSMENT — COLUMBIA-SUICIDE SEVERITY RATING SCALE - C-SSRS
1. IN THE PAST MONTH, HAVE YOU WISHED YOU WERE DEAD OR WISHED YOU COULD GO TO SLEEP AND NOT WAKE UP?: YES
2. HAVE YOU ACTUALLY HAD ANY THOUGHTS OF KILLING YOURSELF LIFETIME?: NO
2. HAVE YOU ACTUALLY HAD ANY THOUGHTS OF KILLING YOURSELF?: NO
1. IN THE PAST MONTH, HAVE YOU WISHED YOU WERE DEAD OR WISHED YOU COULD GO TO SLEEP AND NOT WAKE UP?: NO
ATTEMPT PAST THREE MONTHS: NO
ATTEMPT LIFETIME: NO

## 2021-12-23 ASSESSMENT — PATIENT HEALTH QUESTIONNAIRE - PHQ9: SUM OF ALL RESPONSES TO PHQ QUESTIONS 1-9: 14

## 2021-12-23 ASSESSMENT — MIFFLIN-ST. JEOR: SCORE: 1678.4
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
## 2021-12-24 ASSESSMENT — ANXIETY QUESTIONNAIRES: GAD7 TOTAL SCORE: 14

## 2022-02-04 ENCOUNTER — TELEPHONE (OUTPATIENT)
Dept: BEHAVIORAL HEALTH | Facility: CLINIC | Age: 32
End: 2022-02-04
Payer: COMMERCIAL

## 2022-02-08 ENCOUNTER — HOSPITAL ENCOUNTER (OUTPATIENT)
Dept: BEHAVIORAL HEALTH | Facility: CLINIC | Age: 32
Discharge: HOME | End: 2022-02-08
Attending: FAMILY MEDICINE | Admitting: FAMILY MEDICINE
Payer: COMMERCIAL

## 2022-02-08 VITALS — HEIGHT: 71 IN | BODY MASS INDEX: 22.4 KG/M2 | WEIGHT: 160 LBS

## 2022-02-08 PROCEDURE — H0001 ALCOHOL AND/OR DRUG ASSESS: HCPCS | Mod: TEL,95

## 2022-02-08 ASSESSMENT — MIFFLIN-ST. JEOR: SCORE: 1707.89

## 2022-02-08 ASSESSMENT — PAIN SCALES - GENERAL: PAINLEVEL: NO PAIN (0)

## 2022-02-08 NOTE — ADDENDUM NOTE
Encounter addended by: Penny Vazquez [facility] on: 2/8/2022 3:44 PM   Actions taken: Clinical Note Signed

## 2022-02-08 NOTE — PROGRESS NOTES
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 42 of the Code of Federal Regulations (CFR) Part 2: Confidentiality laws apply to this note and the information wherein.  Thus, this note cannot be copy and pasted into any other health care staff's note nor can it be included in general medical records sent to ANY outside agency without the patient's written consent.              DAANES Record has been saved.  Assessment ID: [id number]

## 2022-02-15 ENCOUNTER — HOSPITAL ENCOUNTER (OUTPATIENT)
Dept: BEHAVIORAL HEALTH | Facility: CLINIC | Age: 32
End: 2022-02-15
Attending: FAMILY MEDICINE
Payer: COMMERCIAL

## 2022-02-15 VITALS
HEART RATE: 99 BPM | WEIGHT: 158 LBS | BODY MASS INDEX: 22.62 KG/M2 | TEMPERATURE: 97.2 F | OXYGEN SATURATION: 97 % | HEIGHT: 70 IN | SYSTOLIC BLOOD PRESSURE: 135 MMHG | DIASTOLIC BLOOD PRESSURE: 83 MMHG | RESPIRATION RATE: 16 BRPM

## 2022-02-15 LAB — SARS-COV-2 RNA RESP QL NAA+PROBE: NEGATIVE

## 2022-02-15 PROCEDURE — 1002N00001 HC LODGING PLUS FACILITY CHARGE ADULT

## 2022-02-15 PROCEDURE — H2035 A/D TX PROGRAM, PER HOUR: HCPCS | Mod: HQ

## 2022-02-15 PROCEDURE — 87635 SARS-COV-2 COVID-19 AMP PRB: CPT | Performed by: FAMILY MEDICINE

## 2022-02-15 RX ORDER — LANOLIN ALCOHOL/MO/W.PET/CERES
3 CREAM (GRAM) TOPICAL
COMMUNITY
End: 2022-03-12

## 2022-02-15 RX ORDER — AMOXICILLIN 250 MG
2 CAPSULE ORAL DAILY PRN
COMMUNITY
End: 2022-03-12

## 2022-02-15 RX ORDER — LORATADINE 10 MG/1
10 TABLET ORAL DAILY PRN
COMMUNITY
End: 2022-03-12

## 2022-02-15 RX ORDER — ACETAMINOPHEN 325 MG/1
325-650 TABLET ORAL EVERY 4 HOURS PRN
COMMUNITY
End: 2022-03-12

## 2022-02-15 RX ORDER — MAGNESIUM HYDROXIDE/ALUMINUM HYDROXICE/SIMETHICONE 120; 1200; 1200 MG/30ML; MG/30ML; MG/30ML
30 SUSPENSION ORAL EVERY 6 HOURS PRN
COMMUNITY
End: 2022-03-12

## 2022-02-15 ASSESSMENT — ANXIETY QUESTIONNAIRES
7. FEELING AFRAID AS IF SOMETHING AWFUL MIGHT HAPPEN: SEVERAL DAYS
IF YOU CHECKED OFF ANY PROBLEMS ON THIS QUESTIONNAIRE, HOW DIFFICULT HAVE THESE PROBLEMS MADE IT FOR YOU TO DO YOUR WORK, TAKE CARE OF THINGS AT HOME, OR GET ALONG WITH OTHER PEOPLE: SOMEWHAT DIFFICULT
5. BEING SO RESTLESS THAT IT IS HARD TO SIT STILL: SEVERAL DAYS
4. TROUBLE RELAXING: SEVERAL DAYS
1. FEELING NERVOUS, ANXIOUS, OR ON EDGE: MORE THAN HALF THE DAYS
6. BECOMING EASILY ANNOYED OR IRRITABLE: MORE THAN HALF THE DAYS
3. WORRYING TOO MUCH ABOUT DIFFERENT THINGS: SEVERAL DAYS
GAD7 TOTAL SCORE: 9
2. NOT BEING ABLE TO STOP OR CONTROL WORRYING: SEVERAL DAYS

## 2022-02-15 ASSESSMENT — PATIENT HEALTH QUESTIONNAIRE - PHQ9: SUM OF ALL RESPONSES TO PHQ QUESTIONS 1-9: 10

## 2022-02-15 ASSESSMENT — MIFFLIN-ST. JEOR: SCORE: 1682.93

## 2022-02-15 NOTE — PROGRESS NOTES
Patient:  Giovany Garrett    Date: February 15, 2022    Comprehensive Assessment UPDATE       Comprehensive Summary Update and Review  Counselor met with patient on 2-15-22 and reviewed the Comprehensive Assessment.    There were no changes/updates identified by patient or in chart entries.

## 2022-02-15 NOTE — PROGRESS NOTES
Initial Services Plan    Before your first treatment group, please do the following    Immediate health & safety concerns: Look for sober housing and a supportive social network.  Look for a support network (such as AA, NA, DBT group, a Religion group, etc.)    Suggestions for client during the time between intake & completion of treatment plan:  Tour your treatment center (unit or outpatient clinic).  Introduce yourself to the treatment group.  Spend time getting to know your peers.  Complete the problem list for your treatment plan.  Start drug and alcohol use history.  Review your patient or client handbook.    Client issues to be addressed in the first treatment sessions:  Identify motivations(s) for coming to treatment, i.e. legal, family, job, self  Identify concerns about coming into treatment, i.e. fear of failing again, sharing a room in treatment  Identify concerns about going to group, i.e. fear of talking in group  Identify concerns about contacting boss, PO  Identify outside concerns that may interfere with treatment, i.e. bills not getting paid, homesick for children    Pamela Zafar, [facility]  2/15/2022

## 2022-02-15 NOTE — PROGRESS NOTES
Progress Note    This patient had a Comprehensive Substance Abuse assessment on 02/08/2022 completed by JOSTIN Sterling.  This patient was seen for a face to face update of the Comprehensive Substance Abuse assessment on 2/15/2022 by JOSTIN Guillen.  INSIDE: The patient's Comprehensive Substance Abuse assessment completed on 02/08/2022 is in the patient's electronic medical record in Epic in the Chart Review section under the Notes/Trans Tab.    Alcohol/Drug use since the last CD evaluation (include date of last use):     Pt's last use of methamphetamine was on 2/14 in the morning. He reports he is tired today.      Please note any other clinical changes since the last CD evaluation (such as medication changes, additional legal charges, detoxification admissions, overdoses, etc.)     No significant changes since the last CD evaluation       ASAM Dimensions Original scores Current Scores   I.) Intoxication and Withdrawal: 0 0   II.) Biomedical:  0 0   III.) Emotional and Behavioral:  1 1   IV.) Readiness to Change:  1 1   V.) Relapse Potential: 4 4   VI.) Recovery Environmental: 3 3     Please list clinical justifications for the above ASAM score changes since the original comprehensive assessment:     None of the ASAM scores on the six dimensions had changed since the Comprehensive Substance Abuse assessment was completed on 2/8/2022.       Current DEEPIKA: Current UA:     0.00     Negative for all screened drugs. Pt will need to be screened for Fentanyl.       PHQ-9, JENI-7   PHQ-9 on 2/15/2022 JENI-7 on 2/15/2022   The patient's PHQ-9 score was 11 out of 27, indicating moderate depression.   The patient's JENI-7 score was 9 out of 21, indicating mild anxiety.       [place]-Suicide Severity Rating Scale Reassessment   Have you ever wished you were dead or that you could go to sleep and not wake up?  Past Month:  No     Have you actually had any thoughts of killing yourself?  Past Month:  No     Have you  been thinking about how you might do this?     Past Month:  No   Lifetime:  No   Have you had these thoughts and had some intention of acting on them?     Past Month:  No   Lifetime:  No   Have you started to work out the details of how to kill yourself?   Past Month:  No   Lifetime:  No   Do you intend to carry out this plan?   No     When you have the thoughts how long do they last?  The patient denied having any suicidal thoughts within the past month.     Are there things - anyone or anything (i.e. family, Tenriism, pain of death) that stopped you from wanting to die or acting on thoughts of suicide?  Does not apply       2008  [employer]  Used with permission by Jennifer Kilgore, PhD.       Guide to C-SSRS Risk Ratings   NO IDEATION:  with no active thoughts IDEATION: with a wish to die. IDEATION: with active thoughts. Risk Ratings   If Yes No No 0 - Very Low Risk   If NA Yes No 1 - Low Risk   If NA Yes Yes 2 - Low/moderate risk   IDEATION: associated thoughts of methods without intent or plan INTENT: Intent to follow through on suicide PLAN: Plan to follow through on suicide Risk Ratings cont...   If Yes No No 3 - Moderate Risk   If Yes Yes No 4 - High Risk   If Yes Yes Yes 5 - High Risk   The patient's ADDITIONAL RISK FACTORS and lack of PROTECTIVE FACTORS may increase their overall suicide risk ratings.     Additional Risk Factors:    Tendency to be socially isolated and/or cut off from the support of others     A recent loss that was significant to the patient, i.e. loss of job, loss of home, divorce, break-up, etc.   Protective Factors:    Having people in his/her life that would prevent the patient from considering a suicide attempt (i.e. young children, spouse, parents, etc.)     Having pet(s) that give companionship and/or would prevent the patient from considering a suicide attempt     An absence of mental health issues or stable and well treated mental health  "issues     An absence of chronic health problems or stable and well treated chronic health issues     Having easy access to supportive family members     Having restricted access to highly lethal means of suicide     Risk Status   0. - Very Low Risk:  Evaluation Counselors:  Document in Epic / SBAR to counselor \"Very Low Risk\".      Treatment Counselors:  Reassess upon admission as applicable, assess weekly in progress notes under Dimension 3 and summarize in Discharge / Treatment summary under Dimension 3.     Additional information to support suicide risk rating: There was no additional information to provide at this time.       "

## 2022-02-15 NOTE — PROGRESS NOTES
"[facility]  [address] [place] [address] 5th and 6th [place] [address]        ADULT CD ASSESSMENT ADDENDUM      Patient Name: Giovany Garrett  Cell Phone:   Home: [phone] (home)    Mobile:   Telephone Information:   Mobile [phone]       Email:  [email or web address]  Emergency Contact: Ashley Goldberg- ex wife   Tel: [phone]    The patient reported being:  Single, no serious involvement    With which race do you identify? White    Initial Screening Questions     1. Are you currently having severe withdrawal symptoms that are putting yourself or others in danger?  No    2. Are you currently having severe medical problems that require immediate attention?  No    3. Are you currently having severe emotional or behavioral problems that are putting yourself or others at risk of harm?  No    4. Do you currently participate in community robert activities, such as attending [employer], temple, Congregation or Latter day services?  No    5. How does your spirituality impact your recovery?  It helps. I don't know what to believe in. I try. I\"m open.     6. Do you currently self-administer your medications?  The patient is not currently taking any prescription or over the counter medications.    Do you have a valid 's license?    Yes       Mental Health Status   Physical Appearance/Attire: Appears stated age and Attire appropriate to age/situation   Hygiene: well groomed   Eye Contact: at examiner   Speech Rate:  regular   Speech Volume: regular   Speech Quality: fluid   Cognitive/Perceptual:  reality based   Cognition: memory intact    Judgment: intact and able to concentrate   Insight: intact and able to concentrate   Orientation:  time, place, person and situation   Thought: logical    Hallucinations:  none   General Behavioral Tone: cooperative   Psychomotor Activity: no problem noted   Gait:  no problem   Mood: appropriate and anxious   Affect: congruence/appropriate "   Counselor Notes: NA     Criteria for Diagnosis: DSM-5 Criteria for Substance Use Disorders      Cannabis Use Disorder Moderate - 304.30 (F12.20)  Opioid Use Disorder Moderate - 304.00 (F11.20)  Amphetamine Use Disorder Severe - 304.40 (F15.20)  Tobacco Use Disorder Severe - 305.10 (F17.200)  Depression NOS, per patient self-report  Anxiety disorder NOS, per patient self-report    Level of Care   I.) Intoxication and Withdrawal: 0   II.) Biomedical:  0   III.) Emotional and Behavioral:  1   IV.) Readiness to Change:  1   V.) Relapse Potential: 4   VI.) Recovery Environmental: 3     Initial Problem List     The patient is currently homeless  The patient has unstable housing  The patient lacks relapse prevention skills  The patient has poor coping skills  The patient has poor refusal skills   The patient lacks a sober peer support network  The patient has a tendency to isolate  The patient has dual issues of MI and CD  The patient lacks the ability to effectively manage his/her mental health issues  The patient has a significant history of grief and loss issues  The patient has current legal issues    Patient/Client is willing to follow treatment recommendations.  Yes    Counselor: JOSTIN Guillen

## 2022-02-15 NOTE — PROGRESS NOTES
Comprehensive Assessment Summary     Based on client interview, review of previous assessments and   comprehensive assessment interview the following diagnosis and recommendations are:     Patient: Giovany Garrett  MRN; [id number]   : 1990  Age: 31 year old Sex: male       Client meets criteria for:  Stimulant Related Disorder, Severe, F15.20, Amphetamine type substance    Dimension One: Acute Intoxication/Withdrawal Potential     Ratin     (Consider the client's ability to cope with withdrawal symptoms and current state of intoxication)  Patient reports some withdrawal discomfort but is able to attend all programing currently.    Dimension Two: Biomedical Condition and Complications    Ratin  (Consider the degree to which any physical disorder would interfere with treatment for substance abuse, and the client's ability to tolerate any related discomfort; determine the impact of continued chemical use on the unborn child if the client is pregnant)  Patient denies any current biomedical concerns.    Dimension Three: Emotional/Behavioral/Cognitive Conditions & Complications    Ratin  (Determine the degree to which any condition or complications are likely to interfere with treatment for substance abuse or with functioning in significant life areas and the likelihood of risk of harm to self or others)   Patient has a history of Substance Use Disorder but denies any other mental health diagnosis. Patient does report that he's met with a mental health therapist and has taken medication in the past. Rule out/in ADHD.     Dimension Four: Treatment Acceptance/Resistance     Ratin  (Consider the amount of support and encouragement necessary to keep the client involved in treatment)   Patient appears to be motivated with active reinforcement to explore treatment and strategies for change, but is ambivalent about the need to change.Patient appears to be in the contemplation Stage of Change. Patient  reports that his 2 year old son is a motivator for him.    Dimension Five: Continued Use/Relaspe Prevention     Ratin  (Consider the degree to which the client's recognizes relapse issues and has the skills to prevent relapse of either substance use or mental health problems)   Patient reports several previous CD treatments  and subsequent relapses. Patient reports that his longest period of sobriety as 3 years.    Dimension Six: Recovery Environment     Rating:   3  (Consider the degree to which key areas of the client's life are supportive of or antagonistic to treatment participation and recovery)   Patient lives alone in the apartment. Patient reports that his wife and son recently left him due to his relapse. Patient would benefit from a sober support network including attending 12 Step meetings and working with a sponsor. Patient reports failing a recent UA at work. Patient has a history of legals. Patient lacks structured and meaningful activities in his life. Patient identifies his parents and siblings as part of his support network.    I have reviewed the information on the assessment, psychosocial and medical history and checklist:        the following changes have been made  DIM VI: 2, 3

## 2022-02-15 NOTE — PROGRESS NOTES
Name: Giovany Garrett  Date: 2/15/2022  Medical Record: [id number]  Envelope Number: [id number]  List of Contents (List each item separately in new row):     Orajel, Cold sore treatement    Admission:  I am responsible for any personal items that are not sent to the safe or pharmacy.  [facility] is not responsible for loss, theft or damage of any property in my possession.    Patient Signature:  ___________________________________________       Date/Time:__________________________    Staff Signature: __________________________________       Date/Time:__________________________    2nd Staff person, if patient is unable/unwilling to sign:    ________________________________________________________       Date/Time: __________________________    Discharge:  [facility] has returned all of my personal belongings:    Patient Signature: ________________________________________     Date/Time: ____________________________________    Staff Signature: ______________________________________     Date/Time:_____________________________________

## 2022-02-15 NOTE — PROGRESS NOTES
Giovany attended PM Skills group for 1 hr. Patient took part in a group exercise/discussion on DBT applications in recovery
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
## 2022-02-16 ENCOUNTER — HOSPITAL ENCOUNTER (OUTPATIENT)
Dept: BEHAVIORAL HEALTH | Facility: CLINIC | Age: 32
End: 2022-02-16
Attending: FAMILY MEDICINE
Payer: COMMERCIAL

## 2022-02-16 VITALS — TEMPERATURE: 97.7 F | OXYGEN SATURATION: 98 %

## 2022-02-16 PROBLEM — F19.20 CHEMICAL DEPENDENCY (H): Status: ACTIVE | Noted: 2022-02-16

## 2022-02-16 PROCEDURE — 1002N00001 HC LODGING PLUS FACILITY CHARGE ADULT

## 2022-02-16 PROCEDURE — H2035 A/D TX PROGRAM, PER HOUR: HCPCS | Mod: HQ

## 2022-02-16 PROCEDURE — H2035 A/D TX PROGRAM, PER HOUR: HCPCS

## 2022-02-16 PROCEDURE — 80307 DRUG TEST PRSMV CHEM ANLYZR: CPT | Performed by: FAMILY MEDICINE

## 2022-02-16 ASSESSMENT — ANXIETY QUESTIONNAIRES: GAD7 TOTAL SCORE: 9

## 2022-02-16 NOTE — PROGRESS NOTES
Patient:  Giovany Garrett     Date:  2/16/22     Comprehensive Assessment UPDATE         Comprehensive Summary Update and Review  Counselor met with patient on 2/16/22 and reviewed the Comprehensive Assessment.    There were no changes/updates identified by patient or in chart entries.

## 2022-02-16 NOTE — PROGRESS NOTES
Name: Giovany Garrett  Date: 2/15/2022  Medical Record: [id number]  Envelope Number: [id number]  List of Contents (List each item separately in new row):   1 cellphone   1 cable and   Admission:  I am responsible for any personal items that are not sent to the safe or pharmacy.  [facility] is not responsible for loss, theft or damage of any property in my possession.      Patient Signature:  ___________________________________________       Date/Time:__________________________    Staff Signature: __________________________________       Date/Time:__________________________    2nd Staff person, if patient is unable/unwilling to sign:      __________________________________________________________       Date/Time: __________________________      Discharge:  [facility] has returned all of my personal belongings:    Patient Signature: ________________________________________     Date/Time: ____________________________________    Staff Signature: ______________________________________     Date/Time:_____________________________________

## 2022-02-16 NOTE — PROGRESS NOTES
Acknowledgement of Current Treatment Plan - Initial Treatment Plan     INITIAL TREATMENT PLAN:     1. I have participated in creating my treatment plan with my counselor on 2/16/22.  I agree with the plan as it is written in the electronic health record.    Name Signature/Date        Name of Counselor Signature/Date   JAYASHREE Prado-ANNE-MARIE Greenfield [facility] [place] [facility] [place]      2. I have completed and reviewed my Safety Plan with my counselor and signed this on 2/16/22.I have been given the hard copy of this plan.    Patient signature/date:      ________________________________________________________________    3. Last Use Date:      Patient signature/date:     ________________________________________________________________

## 2022-02-16 NOTE — GROUP NOTE
Psychoeducation Group Documentation    PATIENT'S NAME: Giovany Garrett  MRN:   [id number]  :   1990  ACCT. NUMBER: [id number]  DATE OF SERVICE: 22  START TIME:  8:30 AM  END TIME:  9:30 AM  FACILITATOR(S): Owen Linda LADC  TOPIC: BEH Pyschoeducation  Number of patients attending the group:  6  Group Length:  1 Hours    Skills Group Therapy Type: Recovery skills    Summary of Group / Topics Discussed:    Relationship/social skills and Balanced lifestyle skills          Group Attendance:  Attended group session    Patient's response to the group topic/interactions:  cooperative with task    Patient appeared to be Attentive and Engaged.         Client specific details:  Giovany gave appropriate feedback..

## 2022-02-16 NOTE — GROUP NOTE
Group Therapy Documentation    PATIENT'S NAME: Giovany Garrett  MRN:   [id number]  [facility]   1990  [id number]  DATE OF SERVICE: 22  START TIME:  9:45 AM  END TIME: 11:25 AM  FACILITATOR(S): Kirit Greenfield LADC; Owen Linda LADC; Ruba Ha  TOPIC: BEH Group Therapy  Number of patients attending the group:  6  Group Length:  1.5 Hours    Group Therapy Type: Recovery strategies    Summary of Group / Topics Discussed:    Recovery Principles      Group Attendance:  Attended group session    Patient's response to the group topic/interactions:  cooperative with task    Patient appeared to be Attentive.        Client specific details:  Giovany attended AM group. Patient checked in, was attentive, engaged, and was supportive of group peers.

## 2022-02-16 NOTE — GROUP NOTE
Group Therapy Documentation    PATIENT'S NAME: Giovany Garrett  MRN:   [id number]  [facility]   1990  [id number]  DATE OF SERVICE: 22  START TIME: 12:30 PM  END TIME:  2:30 PM  FACILITATOR(S): Owen Linda LADC; Kirit Greenfield LADC  TOPIC: BEH Group Therapy  Number of patients attending the group:  6  Group Length:  2 Hours    Group Therapy Type: Recovery strategies and Emotion processing    Summary of Group / Topics Discussed:    Disease of addiction and Relapse prevention      Group Attendance:  Attended group session    Patient's response to the group topic/interactions:  cooperative with task and expressed readiness to alter behaviors    Patient appeared to be Actively participating, Attentive and Engaged.        Client specific details:  Giovany gave appropriate feedback..

## 2022-02-17 ENCOUNTER — HOSPITAL ENCOUNTER (OUTPATIENT)
Dept: BEHAVIORAL HEALTH | Facility: CLINIC | Age: 32
End: 2022-02-17
Attending: FAMILY MEDICINE
Payer: COMMERCIAL

## 2022-02-17 VITALS — OXYGEN SATURATION: 99 % | TEMPERATURE: 98.6 F

## 2022-02-17 PROCEDURE — 1002N00001 HC LODGING PLUS FACILITY CHARGE ADULT

## 2022-02-17 PROCEDURE — H2035 A/D TX PROGRAM, PER HOUR: HCPCS | Mod: HQ

## 2022-02-17 NOTE — GROUP NOTE
Group Therapy Documentation    PATIENT'S NAME: Giovany Garrett  MRN:   [id number]  [facility]   1990  [id number]  DATE OF SERVICE: 22  START TIME:  3:00 PM  END TIME:  4:00 PM  FACILITATOR(S): Luanne Cobb  TOPIC: BEH Group Therapy  Number of patients attending the group:  26    Group Length:  1 Hours    Group Therapy Type: Emotion processing    Summary of Group / Topics Discussed:    Cognitive behavioral therapy skills      Group Attendance:  Attended group session    Patient's response to the group topic/interactions:  cooperative with task    Patient appeared to be Actively participating, Attentive and Engaged.        Client specific details:  Patient was attentive and participative during lecture..

## 2022-02-17 NOTE — GROUP NOTE
Group Therapy Documentation    PATIENT'S NAME: Giovany Garrett  MRN:   [id number]  [facility]   1990  [id number]  DATE OF SERVICE: 22  START TIME:  9:00 AM  END TIME: 11:00 AM  FACILITATOR(S): Kirit Greenfield LADC; Luanne Cobb; Ruba Ha  TOPIC: BEH Group Therapy  Number of patients attending the group:  6  Group Length:  2 Hours    Group Therapy Type: Recovery strategies    Summary of Group / Topics Discussed:    Recovery Principles      Group Attendance:  Attended group session    Patient's response to the group topic/interactions:  cooperative with task    Patient appeared to be Attentive.        Client specific details:  Giovany attended AM group. Patient checked in, and took part in a group exercise/discussion on his relationship with his father. .

## 2022-02-17 NOTE — PROGRESS NOTES
This writer spoke with patient and he understands that he will be asked to take a UA daily. Patient confirmed that he understands.

## 2022-02-17 NOTE — GROUP NOTE
Group Therapy Documentation    PATIENT'S NAME: Giovany Garrett  MRN:   [id number]  :   1990  [id number]  DATE OF SERVICE: 22  START TIME: 12:30 PM  END TIME:  2:30 PM  FACILITATOR(S): Kirit Greenfield LADC; Ruba Ha; Ashley Posada  TOPIC: BEH Group Therapy  Number of patients attending the group:  6  Group Length:  2 Hours    Group Therapy Type: Recovery strategies    Summary of Group / Topics Discussed:    Recovery Principles      Group Attendance:  Attended group session    Patient's response to the group topic/interactions:  cooperative with task    Patient appeared to be Attentive.        Client specific details:  Giovany attended PM Spiritual Care group. Patient took part in a group exercise/discussion on finding meaning, including spirituality, in ones life.

## 2022-02-18 ENCOUNTER — HOSPITAL ENCOUNTER (OUTPATIENT)
Dept: BEHAVIORAL HEALTH | Facility: CLINIC | Age: 32
End: 2022-02-18
Attending: FAMILY MEDICINE
Payer: COMMERCIAL

## 2022-02-18 VITALS — TEMPERATURE: 98.1 F | OXYGEN SATURATION: 98 %

## 2022-02-18 PROCEDURE — 1002N00001 HC LODGING PLUS FACILITY CHARGE ADULT

## 2022-02-18 PROCEDURE — H2035 A/D TX PROGRAM, PER HOUR: HCPCS | Mod: HQ

## 2022-02-18 NOTE — GROUP NOTE
Group Therapy Documentation    PATIENT'S NAME: Giovany Garrett  MRN:   [id number]  [facility]   1990  [id number]  DATE OF SERVICE: 22  START TIME: 12:30 PM  END TIME:  2:30 PM  FACILITATOR(S): Kirit Greenfield LADC  TOPIC: BEH Group Therapy  Number of patients attending the group:  7  Group Length:  2 Hours    Group Therapy Type: Recovery strategies    Summary of Group / Topics Discussed:    Recovery Principles      Group Attendance:  Attended group session    Patient's response to the group topic/interactions:  cooperative with task    Patient appeared to be Attentive.        Client specific details:  Giovany attended PM group. Patient took part in an exercise/discussion on triumphing through adversity.

## 2022-02-18 NOTE — GROUP NOTE
Group Therapy Documentation    PATIENT'S NAME: Giovany Garrett  MRN:   [id number]  [facility]   1990  [id number]  DATE OF SERVICE: 22  START TIME:  9:00 AM  END TIME: 11:00 AM  FACILITATOR(S): Kirit Greenfield LADC  TOPIC: BEH Group Therapy  Number of patients attending the group:  6  Group Length:  2 Hours    Group Therapy Type: Recovery strategies    Summary of Group / Topics Discussed:    Recovery Principles      Group Attendance:  Attended group session    Patient's response to the group topic/interactions:  cooperative with task    Patient appeared to be Attentive.        Client specific details:  Giovany attended AM group.Patient took part in a discussion on euphoric recall and relapse. Patient also took part in a group walk, and talked about the 12 Steps and the benefits of recovery.

## 2022-02-19 ENCOUNTER — HOSPITAL ENCOUNTER (OUTPATIENT)
Dept: BEHAVIORAL HEALTH | Facility: CLINIC | Age: 32
End: 2022-02-19
Attending: FAMILY MEDICINE
Payer: COMMERCIAL

## 2022-02-19 VITALS — OXYGEN SATURATION: 98 % | TEMPERATURE: 97.9 F

## 2022-02-19 PROCEDURE — H2035 A/D TX PROGRAM, PER HOUR: HCPCS | Mod: HQ

## 2022-02-19 PROCEDURE — 1002N00001 HC LODGING PLUS FACILITY CHARGE ADULT

## 2022-02-19 NOTE — GROUP NOTE
Group Therapy Documentation    PATIENT'S NAME: Giovany Garrett  MRN:   [id number]  [facility]   1990  [id number]  DATE OF SERVICE: 22  START TIME:  9:00 AM  END TIME: 11:00 AM  FACILITATOR(S): Julio Mccauley LADC; Piero Vences LADC  TOPIC: BEH Group Therapy  Number of patients attending the group:  8  Group Length:  2 Hours    Group Therapy Type: Recovery strategies    Summary of Group / Topics Discussed:    Recovery Principles, Relationship/socialization, Balanced lifestyle, and Disease of addiction      Group Attendance:  Attended group session    Patient's response to the group topic/interactions:  cooperative with task    Patient appeared to be Attentive.        Client specific details:  Giovany listened to the speaker and then participated in the morning lecture.

## 2022-02-19 NOTE — GROUP NOTE
Group Therapy Documentation    PATIENT'S NAME: Giovany Garrett  MRN:   [id number]  [facility]   1990  [id number]  DATE OF SERVICE: 22  START TIME: 12:30 PM  END TIME:  2:30 PM  FACILITATOR(S): Julio Mccauley LADC; Jeferson Britt LADC  TOPIC: BEH Group Therapy  Number of patients attending the group:  8  Group Length:  2 Hours    Group Therapy Type: Recovery strategies    Summary of Group / Topics Discussed:    Recovery Principles and Relapse prevention      Group Attendance:  Attended group session    Patient's response to the group topic/interactions:  cooperative with task    Patient appeared to be Attentive.        Client specific details:  Giovany participated in the afternoon lecture on relapse prevention.

## 2022-02-20 ENCOUNTER — HOSPITAL ENCOUNTER (OUTPATIENT)
Dept: BEHAVIORAL HEALTH | Facility: CLINIC | Age: 32
End: 2022-02-20
Attending: FAMILY MEDICINE
Payer: COMMERCIAL

## 2022-02-20 VITALS — OXYGEN SATURATION: 98 % | TEMPERATURE: 98.2 F

## 2022-02-20 PROCEDURE — 1002N00001 HC LODGING PLUS FACILITY CHARGE ADULT

## 2022-02-20 PROCEDURE — H2035 A/D TX PROGRAM, PER HOUR: HCPCS | Mod: HQ

## 2022-02-20 NOTE — GROUP NOTE
Psychoeducation Group Documentation    PATIENT'S NAME: Giovany Garrett  MRN:   [id number]  :   1990  [facility] NUMBER: [id number]  DATE OF SERVICE: 22  START TIME:  8:40 AM  END TIME: 10:40 AM  FACILITATOR(S): Julio Mccauley LADC; Maryjo Vanegas RN  TOPIC: BEH Pyschoeducation  Number of patients attending the group:  8  Group Length:  2 Hours    Skills Group Therapy Type: Healthy behaviors development    Summary of Group / Topics Discussed:    Balanced lifestyle skills          Group Attendance:  Attended group session    Patient's response to the group topic/interactions:  cooperative with task    Patient appeared to be Attentive.         Client specific details:  Giovany participated in the morning nursing lecture on HIV/AIDS.

## 2022-02-20 NOTE — GROUP NOTE
Group Therapy Documentation    PATIENT'S NAME: Giovany Garrett  MRN:   [id number]  [facility]   1990  [id number]  DATE OF SERVICE: 22  START TIME: 12:30 PM  END TIME:  1:30 PM  FACILITATOR(S): Julio Mccauley LADC; Piero Vences LADC  TOPIC: BEH Group Therapy  Number of patients attending the group:  26  Group Length:  1 Hours    Group Therapy Type: Recovery strategies    Summary of Group / Topics Discussed:    Recovery Principles, Relationship/socialization, and Disease of addiction      Group Attendance:  Attended group session    Patient's response to the group topic/interactions:  cooperative with task    Patient appeared to be Attentive and Engaged.        Client specific details:  .

## 2022-02-21 ENCOUNTER — HOSPITAL ENCOUNTER (OUTPATIENT)
Dept: BEHAVIORAL HEALTH | Facility: CLINIC | Age: 32
End: 2022-02-21
Attending: FAMILY MEDICINE
Payer: COMMERCIAL

## 2022-02-21 ENCOUNTER — LAB (OUTPATIENT)
Dept: LAB | Facility: CLINIC | Age: 32
End: 2022-02-21
Payer: COMMERCIAL

## 2022-02-21 VITALS — TEMPERATURE: 98.2 F | OXYGEN SATURATION: 98 %

## 2022-02-21 DIAGNOSIS — F19.20 CHEMICAL DEPENDENCY (H): ICD-10-CM

## 2022-02-21 LAB — FENTANYL UR QL: NORMAL

## 2022-02-21 PROCEDURE — 80307 DRUG TEST PRSMV CHEM ANLYZR: CPT

## 2022-02-21 PROCEDURE — 86803 HEPATITIS C AB TEST: CPT

## 2022-02-21 PROCEDURE — 86780 TREPONEMA PALLIDUM: CPT

## 2022-02-21 PROCEDURE — 87389 HIV-1 AG W/HIV-1&-2 AB AG IA: CPT

## 2022-02-21 PROCEDURE — 1002N00001 HC LODGING PLUS FACILITY CHARGE ADULT

## 2022-02-21 PROCEDURE — 87491 CHLMYD TRACH DNA AMP PROBE: CPT

## 2022-02-21 PROCEDURE — 36415 COLL VENOUS BLD VENIPUNCTURE: CPT

## 2022-02-21 PROCEDURE — 87591 N.GONORRHOEAE DNA AMP PROB: CPT

## 2022-02-21 PROCEDURE — H2035 A/D TX PROGRAM, PER HOUR: HCPCS | Mod: HQ

## 2022-02-21 NOTE — PROGRESS NOTES
Patient:  Giovany Garrett    Week of 2/15/22-2/21/22        Day Monday Tuesday Wednesday Thursday Friday Saturday Sunday   Group Hours   0 hours 0 hours 4 hours 4 hours 4 hours 4 hours 3 hours   Skills Hours   0 hours 1.0 hours 1 hours 1.0 hours 0 hours 0 hours 0 hours   Individual Session (LADC)    0 hours 0 hours 0 hours 0 hours 0 hours 0 hours 0 hours   Individual Session  (psychotherapy)   0 hours 0 hours 0 hours 0 hours 0 hours 0 hours 0 hours   Peer-led Recovery Group   0 hours  1.0 hours 1.0 hours 1.0 hours 1.0 hours 1.0 hours 1.0 hours                 Adult CD Progress Note and Treatment Plan Review     Attendance  Please refer to OP BEH CD Adult Attendance Record Documentation Flowsheet    Support group attended this week: yes    Reporting sobriety:  yes    Treatment Plan     Treatment Plan Review competed on: 2/21/22       Client preferred learning style: Visual  Demonstration    Staff Members contributing: Kirit Greenfield [facility], Owen Linda [facility], Ruba Ha, ADC-T                     Received Supervision: no    Client: contributed to goals and plan.    Client received copy of plan/revised plan: Yes    Client agrees with plan/revised plan: Yes    Changes to Treatment Plan: No    New Goals added since last review: Current    Goals worked on since last review: 1st step, relationships workshop    Strategies effective: yes    Strategies need these changes: continue with treatment process, treatment plan    1) Care Coordination Activities: Pt is beginning to look at outpatient programs and sober housing with staff .  2) Medical, Mental Health, and other appointments the client attended: Pt reported no appointments this past week.  3) Medication issues: Pt reported no concerns at this time.  4) Physical and mental health problems: Pt reported no concerns at this time.  5) Any changes in Vulnerable Adult Status? No. If yes, add to treatment plan and individual abuse prevention plan.  6) Review and  "evaluation of the individual abuse prevention plan: Current IAPP for this program is adequate for this client.    ASAM Risk Rating:    Dimension 1 0 No change.     Dimension 2 0 No change.    Dimension 3 1 Patient is working on the assignments:  \"Letter to wife and son regarding how his meth use has impacted their relationships. Patient is also working on his \"My Anxiety Profile\". Patient attended a Spiritual Care group session facilitated by Ashley Posada.    Dimension 4 1 Patient completed and presented his \"1st step\" assignment. Patient is working on completing his \"Drug Use History\". Patient appears to be gaining motivation for recovery.     Dimension 5 4  Patient needs to identify his relapse triggers and warning signs. Patient is working on the assignments: \"25 Behavioral changes\"and \"Relapse Prevention Planning\".      Dimension 6 3 Pt recently failed a UA at work. Patient lacks daily structure and sober recreation.  Pt has a history of legals. Pt lacks a sober support network. Pt has been attending sober support meetings while in treatment.  Patient attended the Relationships weekend workshopPatient is working on the assignment \"Building My Support Network\"    Any changes in Vulnerable Adult Status?  No  If yes, add to treatment plan and individual abuse prevention plan.         Guide to C-SSRS Risk Ratings   NO IDEATION:  with no active thoughts IDEATION: with a wish to die. IDEATION: with active thoughts. Risk Ratings   If Yes No No 0 - Very Low Risk   If NA Yes No 1 - Low Risk   If NA Yes Yes 2 - Low/moderate risk   IDEATION: associated thoughts of methods without intent or plan INTENT: Intent to follow through on suicide PLAN: Plan to follow through on suicide Risk Ratings cont...   If Yes No No 3 - Moderate Risk   If Yes Yes No 4 - High Risk   If Yes Yes Yes 5 - High Risk   The patient's ADDITIONAL RISK FACTORS and lack of PROTECTIVE FACTORS may increase their overall suicide risk ratings.    "   Additional Risk Factors:    Someone close to the patient (family member/friend) completed a suicide     Significant history of having untreated or poorly treated mental health symptoms     Significant history of untreated or poorly treated chronic pain issues     Tendency to be socially isolated and/or cut off from the support of others     A recent death of someone close to the patient and/or unresolved grief and loss issues     A recent loss that was significant to the patient, i.e. loss of job, loss of home, divorce, break-up, etc.     Significant history of trauma and/or abuse issues     A triggering event(s) leading to humiliation, shame or despair     History of impulsive or aggressive behaviors   Protective Factors:    Having people in his/her life that would prevent the patient from considering a suicide attempt (i.e. young children, spouse, parents, etc.)     Having cultural, Lutheran or spiritual beliefs that discourage suicide     Having restricted access to highly lethal means of suicide      Patient's current risk rating:  Past 24 hours: 0. - Very Low     Family Involvement:   none schedule this week    Data:   offered feedback client did participate       Intervention:   Aftercare planning  Behavior modification  Cognitive Behavioral Therapy  Counselor feedback  Education  Emotional management  Group feedback  Motivational Enhancement Therapy  Relapse prevention  Twelve Step facilitation  Client & counselor reviewed and signed ISP & assessment summary  Mental health education       Assessment:   Stages of Change Model  Contemplation    Appears/Sounds:  Cooperative       Plan:  Next treatment task referral out  Other Continue with treatment assignments, treatment process       JOSTIN Mojica

## 2022-02-21 NOTE — PROGRESS NOTES
Patient signed an SERGIO for GARCIA Preston. This writer faxed PO an SERGIO and confirmation that patient began treatment on 2/15 and is scheduled to discharge on 3/15.

## 2022-02-21 NOTE — GROUP NOTE
Group Therapy Documentation    PATIENT'S NAME: Giovany Garrett  MRN:   [id number]  [facility]   1990  [id number]  DATE OF SERVICE: 22  START TIME:  9:00 AM  END TIME: 11:00 AM  FACILITATOR(S): Kirit Greenfield LADC; Owen Linda LADC; Ruba Ha  TOPIC: BEH Group Therapy  Number of patients attending the group:  8  Group Length:  2 Hours    Group Therapy Type: Recovery strategies    Summary of Group / Topics Discussed:    Recovery Principles      Group Attendance:  Attended group session    Patient's response to the group topic/interactions:  cooperative with task    Patient appeared to be Attentive.        Client specific details:  Giovany attended AM group. Patient checked in, and took part in a group discussion. Patient appeared cooperative and supportive of peers.

## 2022-02-21 NOTE — GROUP NOTE
Group Therapy Documentation    PATIENT'S NAME: Giovany Garrett  MRN:   [id number]  :   1990  [id number]  DATE OF SERVICE: 22  START TIME: 12:30 PM  END TIME:  2:30 PM  FACILITATOR(S): Owen Linda LADC; Kirit Greenfield LADC  TOPIC: BEH Group Therapy  Number of patients attending the group:  8  Group Length:  2 Hours    Group Therapy Type: Recovery strategies and Emotion processing    Summary of Group / Topics Discussed:    Recovery Principles, Sober coping skills, Balanced lifestyle, Disease of addiction, Emotions/expression, and Relapse prevention      Group Attendance:  Attended group session    Patient's response to the group topic/interactions:  cooperative with task, expressed readiness to alter behaviors and expressed understanding of topic    Patient appeared to be Actively participating, Attentive and Engaged.        Client specific details:  Giovany presented his first step assignment..

## 2022-02-22 ENCOUNTER — HOSPITAL ENCOUNTER (OUTPATIENT)
Dept: BEHAVIORAL HEALTH | Facility: CLINIC | Age: 32
End: 2022-02-22
Attending: FAMILY MEDICINE
Payer: COMMERCIAL

## 2022-02-22 VITALS — OXYGEN SATURATION: 98 % | TEMPERATURE: 97.2 F

## 2022-02-22 LAB
C TRACH DNA SPEC QL NAA+PROBE: NEGATIVE
HCV AB SERPL QL IA: NONREACTIVE
HIV 1+2 AB+HIV1 P24 AG SERPL QL IA: NONREACTIVE
N GONORRHOEA DNA SPEC QL NAA+PROBE: NEGATIVE
T PALLIDUM AB SER QL: NONREACTIVE

## 2022-02-22 PROCEDURE — H2035 A/D TX PROGRAM, PER HOUR: HCPCS | Mod: HQ

## 2022-02-22 PROCEDURE — 1002N00001 HC LODGING PLUS FACILITY CHARGE ADULT

## 2022-02-22 NOTE — GROUP NOTE
Group Therapy Documentation    PATIENT'S NAME: Giovany Garrett  MRN:   [id number]  [facility]   1990  [id number]  DATE OF SERVICE: 22  START TIME: 12:30 PM  END TIME:  2:30 PM  FACILITATOR(S): Owen Linda LADC; Kirit Greenfield LADC  TOPIC: BEH Group Therapy  Number of patients attending the group:  8  Group Length:  2 Hours    Group Therapy Type: Recovery strategies and Emotion processing    Summary of Group / Topics Discussed:    Recovery Principles, Balanced lifestyle, Disease of addiction, Emotions/expression, Relapse prevention, and Self-care activities      Group Attendance:  Attended group session    Patient's response to the group topic/interactions:  cooperative with task, expressed readiness to alter behaviors, expressed understanding of topic and listened actively    Patient appeared to be Actively participating, Attentive and Engaged.        Client specific details:  Giovany gave appropriate feedback..

## 2022-02-22 NOTE — GROUP NOTE
Group Therapy Documentation    PATIENT'S NAME: Giovany Garrett  MRN:   [id number]  :   1990  [id number]  DATE OF SERVICE: 22  START TIME:  9:00 AM  END TIME: 11:00 AM  FACILITATOR(S): Kirit Greenfield LADC; Ruba Ha; Owen Linda LADC  TOPIC: BEH Group Therapy  Number of patients attending the group:  8  Group Length:  2 Hours    Group Therapy Type: Recovery strategies    Summary of Group / Topics Discussed:    Recovery Principles      Group Attendance:  Attended group session    Patient's response to the group topic/interactions:  cooperative with task    Patient appeared to be Attentive.        Client specific details:  Giovany attended AM group. Patient checked in, was attentive, and was supportive of group peers

## 2022-02-22 NOTE — GROUP NOTE
Psychoeducation Group Documentation    PATIENT'S NAME: Giovany Garrett  MRN:   [id number]  :   1990  [facility] NUMBER: [id number]  DATE OF SERVICE: 22  START TIME:  3:00 PM  END TIME:  4:00 PM  FACILITATOR(S): Julio Mccauley LADC; Malaika Agosto LADC; Luanne Cobb  TOPIC: BEH Pyschoeducation  Number of patients attending the group:  8  Group Length:  1 Hours    Skills Group Therapy Type: Daily living/independence skills    Summary of Group / Topics Discussed:    Relationship/social skills and Balanced lifestyle skills          Group Attendance:  Attended group session    Patient's response to the group topic/interactions:  cooperative with task    Patient appeared to be Attentive.         Client specific details:  Giovany participated in the afternoon skills group on Vulnerability.

## 2022-02-23 ENCOUNTER — HOSPITAL ENCOUNTER (OUTPATIENT)
Dept: BEHAVIORAL HEALTH | Facility: CLINIC | Age: 32
End: 2022-02-23
Attending: FAMILY MEDICINE
Payer: COMMERCIAL

## 2022-02-23 VITALS — OXYGEN SATURATION: 100 % | TEMPERATURE: 97.3 F

## 2022-02-23 DIAGNOSIS — F17.200 NICOTINE DEPENDENCE: Primary | ICD-10-CM

## 2022-02-23 PROCEDURE — 1002N00001 HC LODGING PLUS FACILITY CHARGE ADULT

## 2022-02-23 PROCEDURE — H2035 A/D TX PROGRAM, PER HOUR: HCPCS | Mod: HQ

## 2022-02-23 NOTE — GROUP NOTE
Group Therapy Documentation    PATIENT'S NAME: Giovany Garrett  MRN:   [id number]  [facility]   1990  [id number]  DATE OF SERVICE: 22  START TIME:  9:45 AM  END TIME: 11:20 AM  FACILITATOR(S): Kirit Greenfield LADC; Owen Linda LADC  TOPIC: BEH Group Therapy  Number of patients attending the group:  8  Group Length:  1.5 Hours    Group Therapy Type: Recovery strategies    Summary of Group / Topics Discussed:    Recovery Principles      Group Attendance:  Attended group session    Patient's response to the group topic/interactions:  cooperative with task    Patient appeared to be Attentive.        Client specific details:  Giovany  attended AM group.Patient checked in, and took part in a group discussion. Patient was attentive and engaged.

## 2022-02-23 NOTE — GROUP NOTE
Psychoeducation Group Documentation    PATIENT'S NAME: Giovany Garrett  MRN:   [id number]  :   1990  [facility] NUMBER: [id number]  DATE OF SERVICE: 22  START TIME:  8:30 AM  END TIME:  9:30 AM  FACILITATOR(S): Owen Linda LADC; Meek Babcock MD  TOPIC: BEH Pyschoeducation  Number of patients attending the group:  8  Group Length:  1 Hours    Skills Group Therapy Type: Recovery skills and Medication education    Summary of Group / Topics Discussed:    Balanced lifestyle skills and Medication management skills          Group Attendance:  Attended group session    Patient's response to the group topic/interactions:  cooperative with task    Patient appeared to be Actively participating and Attentive.         Client specific details:  Giovany gave appropriate feedback..

## 2022-02-23 NOTE — GROUP NOTE
Group Therapy Documentation    PATIENT'S NAME: Giovany Garrett  MRN:   [id number]  :   1990  [id number]  DATE OF SERVICE: 22  START TIME: 12:30 PM  END TIME:  2:30 PM  FACILITATOR(S): Owen Linda LADC; Kirit Greenfield LADC  TOPIC: BEH Group Therapy  Number of patients attending the group:  8  Group Length:  2 Hours    Group Therapy Type: Recovery strategies    Summary of Group / Topics Discussed:    Recovery Principles, Sober coping skills, Balanced lifestyle, Disease of addiction, and Emotions/expression      Group Attendance:  Attended group session    Patient's response to the group topic/interactions:  cooperative with task, expressed readiness to alter behaviors and listened actively    Patient appeared to be Actively participating, Attentive and Engaged.        Client specific details:  Giovany gave appropriate feedback..

## 2022-02-23 NOTE — PROGRESS NOTES
Patient met with program  to review and initiate aftercare placement opportunities. Patient presents as a high risk for relapse with limited independent sober living skills.Patient acknowledges a need to enter a program offering outpatient services with an added housing/residential component.  Various programs were reviewed with patient. He was provided contact information and has secured arrangements to move into [facility]. Documentation has been forwarded to [facility] outpatient services.

## 2022-02-24 ENCOUNTER — HOSPITAL ENCOUNTER (OUTPATIENT)
Dept: BEHAVIORAL HEALTH | Facility: CLINIC | Age: 32
End: 2022-02-24
Attending: FAMILY MEDICINE
Payer: COMMERCIAL

## 2022-02-24 VITALS — TEMPERATURE: 97.9 F | OXYGEN SATURATION: 98 %

## 2022-02-24 PROCEDURE — 1002N00001 HC LODGING PLUS FACILITY CHARGE ADULT

## 2022-02-24 PROCEDURE — H2035 A/D TX PROGRAM, PER HOUR: HCPCS | Mod: HQ

## 2022-02-24 NOTE — GROUP NOTE
"Psychoeducation Group Documentation    PATIENT'S NAME: Giovany Garrett  MRN:   [id number]  :   1990  [facility]. NUMBER: [id number]  DATE OF SERVICE: 22  START TIME:  3:00 PM  END TIME:  4:00 PM  FACILITATOR(S): Malaika Agosto LADC; Luanne Cobb; Julio Mccauley LADC  TOPIC: BEH Pyschoeducation  Number of patients attending the group:  24  Group Length:  1 Hours    Skills Group Therapy Type: Daily living/independence skills and Healthy behaviors development    Summary of Group / Topics Discussed:    Balanced lifestyle skills and Symptom management skills    Group Attendance:  Attended group session    Patient's response to the group topic/interactions:  cooperative with task    Patient appeared to be Attentive and Engaged.         Client specific details: Pt was respectful and asked appropriate questions during Dr. Barfield's \"Nutrition for Brain Health\" presentation.  "

## 2022-02-24 NOTE — GROUP NOTE
Group Therapy Documentation    PATIENT'S NAME: Giovany Garrett  MRN:   [id number]  [facility]   1990  [id number]  DATE OF SERVICE: 22  START TIME:  9:00 AM  END TIME: 11:00 AM  FACILITATOR(S): Owen Linda LADC  TOPIC: BEH Group Therapy  Number of patients attending the group:  7  Group Length:  2 Hours    Group Therapy Type: Recovery strategies    Summary of Group / Topics Discussed:    Recovery Principles, Sober coping skills, Balanced lifestyle, and Relapse prevention      Group Attendance:  Attended group session    Patient's response to the group topic/interactions:  cooperative with task, discussed personal experience with topic and expressed readiness to alter behaviors    Patient appeared to be Actively participating, Attentive and Engaged.        Client specific details:  Giovany presented his letter to his son explaining why drugs are more important then him..

## 2022-02-25 ENCOUNTER — HOSPITAL ENCOUNTER (OUTPATIENT)
Dept: BEHAVIORAL HEALTH | Facility: CLINIC | Age: 32
End: 2022-02-25
Attending: FAMILY MEDICINE
Payer: COMMERCIAL

## 2022-02-25 VITALS — OXYGEN SATURATION: 100 % | TEMPERATURE: 98.5 F

## 2022-02-25 PROCEDURE — H2035 A/D TX PROGRAM, PER HOUR: HCPCS | Mod: HQ

## 2022-02-25 PROCEDURE — 1002N00001 HC LODGING PLUS FACILITY CHARGE ADULT

## 2022-02-25 NOTE — GROUP NOTE
Group Therapy Documentation    PATIENT'S NAME: Giovany Garrett  MRN:   [id number]  :   1990  [facility] NUMBER: [id number]  DATE OF SERVICE: 22  START TIME: 12:30 PM  END TIME:  2:30 PM  FACILITATOR(S): Owen Linda LADC  TOPIC: BEH Group Therapy  Number of patients attending the group:  7  Group Length:  2 Hours    Group Therapy Type: Recovery strategies    Summary of Group / Topics Discussed:    Spiritual Care      Group Attendance:  Attended group session    Patient's response to the group topic/interactions:  cooperative with task and discussed personal experience with topic    Patient appeared to be Actively participating, Attentive and Engaged.        Client specific details:  Giovany gave appropriate feedback..

## 2022-02-25 NOTE — GROUP NOTE
Psychoeducation Group Documentation    PATIENT'S NAME: Giovany Garrett  MRN:   [id number]  :   1990  [facility] NUMBER: [id number]  DATE OF SERVICE: 22  START TIME: 12:30 PM  END TIME:  2:30 PM  FACILITATOR(S): Owen Linda LADC; Chele Dias LADC  TOPIC: BEH Pyschoeducation  Number of patients attending the group:  7  Group Length:  2 Hours    Skills Group Therapy Type: Recovery skills    Summary of Group / Topics Discussed:    Balanced lifestyle skills          Group Attendance:  Attended group session    Patient's response to the group topic/interactions:  cooperative with task and discussed personal experience with topic    Patient appeared to be Actively participating and Attentive.         Client specific details:  Giovany gave appropriate feedback..

## 2022-02-25 NOTE — GROUP NOTE
Group Therapy Documentation    PATIENT'S NAME: Giovany Garrett  MRN:   [id number]  :   1990  [id number]  DATE OF SERVICE: 22  START TIME:  9:00 AM  END TIME: 11:00 AM  FACILITATOR(S): Owen Linda LADC  TOPIC: BEH Group Therapy  Number of patients attending the group:  7  Group Length:  2 Hours    Group Therapy Type: Recovery strategies and Emotion processing    Summary of Group / Topics Discussed:    Recovery Principles, Sober coping skills, Balanced lifestyle, Disease of addiction, and Relapse prevention      Group Attendance:  Attended group session    Patient's response to the group topic/interactions:  cooperative with task    Patient appeared to be Actively participating, Attentive and Engaged.        Client specific details:  Giovany gave appropriate feedback..

## 2022-02-26 ENCOUNTER — HOSPITAL ENCOUNTER (OUTPATIENT)
Dept: BEHAVIORAL HEALTH | Facility: CLINIC | Age: 32
End: 2022-02-26
Attending: FAMILY MEDICINE
Payer: COMMERCIAL

## 2022-02-26 VITALS — TEMPERATURE: 96.9 F | OXYGEN SATURATION: 95 %

## 2022-02-26 PROCEDURE — H2035 A/D TX PROGRAM, PER HOUR: HCPCS | Mod: HQ

## 2022-02-26 PROCEDURE — 1002N00001 HC LODGING PLUS FACILITY CHARGE ADULT

## 2022-02-26 NOTE — GROUP NOTE
Group Therapy Documentation    PATIENT'S NAME: Giovany Garrett  MRN:   [id number]  :   1990  [id number]  DATE OF SERVICE: 22  START TIME:  9:00 AM  END TIME: 11:00 AM  FACILITATOR(S): Kirit Greenfield [facility]; Marta Joyner LAD; Estrella Kim [facility]  TOPIC: BEH Group Therapy  Number of patients attending the group:  24  Group Length:  2 Hours    Group Therapy Type: Recovery strategies    Summary of Group / Topics Discussed:    Recovery Principles      Group Attendance:  Attended group session    Patient's response to the group topic/interactions:  cooperative with task    Patient appeared to be Attentive.        Client specific details:  Giovany attended AM workshop. Patient took part in a group exercise/discussion on the importance of honesty in one's recovery.

## 2022-02-26 NOTE — GROUP NOTE
Group Therapy Documentation    PATIENT'S NAME: Giovany Garrett  MRN:   [id number]  :   1990  [facility] NUMBER: [id number]  DATE OF SERVICE: 22  START TIME: 12:30 PM  END TIME:  2:30 PM  FACILITATOR(S): Marta Joyner LADC; Kirit Greenfield LADC; Estrella Kim LADC  TOPIC: BEH Group Therapy  Number of patients attending the group: 24  Group Length:  2 Hours    Group Therapy Type: Recovery strategies    Summary of Group / Topics Discussed:    Relationship/socialization      Group Attendance:  Attended group session    Patient's response to the group topic/interactions:  cooperative with task    Patient appeared to be Attentive and Engaged.        Client specific details: Giovany was attentive during afternoon session of Relationships workshop focusing on identifying personal strengths and skills.

## 2022-02-27 ENCOUNTER — HOSPITAL ENCOUNTER (OUTPATIENT)
Dept: BEHAVIORAL HEALTH | Facility: CLINIC | Age: 32
End: 2022-02-27
Attending: FAMILY MEDICINE
Payer: COMMERCIAL

## 2022-02-27 VITALS — OXYGEN SATURATION: 99 % | TEMPERATURE: 98.2 F

## 2022-02-27 PROCEDURE — H2035 A/D TX PROGRAM, PER HOUR: HCPCS | Mod: HQ

## 2022-02-27 PROCEDURE — 1002N00001 HC LODGING PLUS FACILITY CHARGE ADULT

## 2022-02-27 NOTE — GROUP NOTE
Group Therapy Documentation    PATIENT'S NAME: Giovany Garrett  MRN:   [id number]  :   1990  [id number]  DATE OF SERVICE: 22  START TIME:  8:45 AM  END TIME: 10:45 AM  FACILITATOR(S): Kirit Greenfield [facility]; Estrella Kim [facility]  TOPIC: BEH Group Therapy  Number of patients attending the group:  24  Group Length:  2 Hours    Group Therapy Type: Recovery strategies    Summary of Group / Topics Discussed:    Recovery Principles, Balanced lifestyle, Disease of addiction, and Self-care activities      Group Attendance:  Attended group session    Patient's response to the group topic/interactions:  cooperative with task    Patient appeared to be Attentive.        Client specific details:  Giovany  attended AM workshop group. Patient learned about TB, Hep A, B, C. Patient was attentive and engaged.

## 2022-02-27 NOTE — GROUP NOTE
Group Therapy Documentation    PATIENT'S NAME: Giovany Garrett  MRN:   [id number]  [facility]   1990  [id number]  DATE OF SERVICE: 22  START TIME: 12:30 AM  END TIME:  1:30 PM  FACILITATOR(S): Kirit Greenfield LADC; Estrella Kim LADC  TOPIC: BEH Group Therapy  Number of patients attending the group: 24  Group Length:  2 Hours    Group Therapy Type: Emotion processing    Summary of Group / Topics Discussed:    Relationship/socialization      Group Attendance:  Attended group session    Patient's response to the group topic/interactions:  cooperative with task    Patient appeared to be Actively participating and Attentive.        Client specific details: Pt was appropriate and attentive in group, during Skills lecture this PM on  Relationship .

## 2022-02-28 ENCOUNTER — HOSPITAL ENCOUNTER (OUTPATIENT)
Dept: BEHAVIORAL HEALTH | Facility: CLINIC | Age: 32
End: 2022-02-28
Attending: FAMILY MEDICINE
Payer: COMMERCIAL

## 2022-02-28 VITALS — OXYGEN SATURATION: 98 % | TEMPERATURE: 97.7 F

## 2022-02-28 PROCEDURE — H2035 A/D TX PROGRAM, PER HOUR: HCPCS | Mod: HQ

## 2022-02-28 PROCEDURE — 1002N00001 HC LODGING PLUS FACILITY CHARGE ADULT

## 2022-02-28 NOTE — PROGRESS NOTES
Patient:  Giovany Garrett    Week of 2/21/22-2/27/22        Day Monday Tuesday Wednesday Thursday Friday Saturday Sunday   Group Hours   4 hours 4 hours 4 hours 4 hours 4 hours 4 hours 2 hours   Skills Hours   0 hours 1.0 hours 1 hours 1.0 hours 0 hours 0 hours 1 hours   Individual Session (LADC)    0 hours 0 hours 0 hours 0 hours 0 hours 0 hours 0 hours   Individual Session  (psychotherapy)   0 hours 0 hours 0 hours 0 hours 0 hours 0 hours 0 hours   Peer-led Recovery Group   1.0 hours  1.0 hours 1.0 hours 1.0 hours 1.0 hours 1.0 hours 1.0 hours                 Adult CD Progress Note and Treatment Plan Review     Attendance  Please refer to OP BEH CD Adult Attendance Record Documentation Flowsheet    Support group attended this week: yes    Reporting sobriety:  yes    Treatment Plan     Treatment Plan Review competed on: 2/28/22       Client preferred learning style: Visual  Demonstration    Staff Members contributing: Kirit Greenfield [facility], JOSTIN [place], Ruba Ha, ADC-T                       Received Supervision: no    Client: contributed to goals and plan.    Client received copy of plan/revised plan: Yes    Client agrees with plan/revised plan: Yes    Changes to Treatment Plan: No    New Goals added since last review: Current    Goals worked on since last review: 1st step, relationships workshop    Strategies effective: yes    Strategies need these changes: continue with treatment process, treatment plan    1) Care Coordination Activities: Pt is beginning to look at outpatient programs and sober housing with staff .  2) Medical, Mental Health, and other appointments the client attended: Pt reported no appointments this past week.  3) Medication issues: Pt reported no concerns at this time.  4) Physical and mental health problems: Pt reported no concerns at this time.  5) Any changes in Vulnerable Adult Status? No. If yes, add to treatment plan and individual abuse prevention plan.  6) Review and  "evaluation of the individual abuse prevention plan: Current IAPP for this program is adequate for this client.    ASAM Risk Rating:    Dimension 1 0 No change.     Dimension 2 0 No change.    Dimension 3 1 Patient :  Pt. Completed and presented his 3 page letter to his wife and son regarding how his meth use has impacted their relationship. Patient is also working on his \"My Anxiety Profile\". Patient attended a Spiritual Care group session facilitated by Ashley Posada.     Dimension 4 1 Patient completed and presented his \"3 page letter to this wife and son explaining why Meth is more important than them\" assignment. Patient is working on completing his \"Drug Use History\". Patient appears to be gaining motivation for recovery.     Dimension 5 4  Patient working on identifying his relapse triggers and warning signs. Patient is working on the assignments: \"25 Behavioral changes\"and \"Relapse Prevention Planning\".      Dimension 6 3 Pt recently failed a UA at work. Patient lacks daily structure and sober recreation.  Pt has a history of legals. Pt lacks a sober support network. Pt has been attending sober support meetings while in treatment.  Patient attended the Relationships weekend workshop. Patient is working on the assignment \"Building My Support Network\".    Any changes in Vulnerable Adult Status?  No  If yes, add to treatment plan and individual abuse prevention plan.         Guide to C-SSRS Risk Ratings   NO IDEATION:  with no active thoughts IDEATION: with a wish to die. IDEATION: with active thoughts. Risk Ratings   If Yes No No 0 - Very Low Risk   If NA Yes No 1 - Low Risk   If NA Yes Yes 2 - Low/moderate risk   IDEATION: associated thoughts of methods without intent or plan INTENT: Intent to follow through on suicide PLAN: Plan to follow through on suicide Risk Ratings cont...   If Yes No No 3 - Moderate Risk   If Yes Yes No 4 - High Risk   If Yes Yes Yes 5 - High Risk   The patient's ADDITIONAL RISK " FACTORS and lack of PROTECTIVE FACTORS may increase their overall suicide risk ratings.      Additional Risk Factors:    Someone close to the patient (family member/friend) completed a suicide     Significant history of having untreated or poorly treated mental health symptoms     Significant history of untreated or poorly treated chronic pain issues     Tendency to be socially isolated and/or cut off from the support of others     A recent death of someone close to the patient and/or unresolved grief and loss issues     A recent loss that was significant to the patient, i.e. loss of job, loss of home, divorce, break-up, etc.     Significant history of trauma and/or abuse issues     A triggering event(s) leading to humiliation, shame or despair     History of impulsive or aggressive behaviors   Protective Factors:    Having people in his/her life that would prevent the patient from considering a suicide attempt (i.e. young children, spouse, parents, etc.)     Having cultural, Presybeterian or spiritual beliefs that discourage suicide     Having restricted access to highly lethal means of suicide      Patient's current risk rating:  Past 24 hours: 0. - Very Low     Family Involvement:   none schedule this week    Data:   offered feedback client did participate       Intervention:   Aftercare planning  Behavior modification  Cognitive Behavioral Therapy  Counselor feedback  Education  Emotional management  Group feedback  Motivational Enhancement Therapy  Relapse prevention  Twelve Step facilitation  Client & counselor reviewed and signed ISP & assessment summary  Mental health education       Assessment:   Stages of Change Model  Contemplation    Appears/Sounds:  Cooperative       Plan:  Next treatment task referral out  Other Continue with treatment assignments, treatment process       JOSTIN Mojica, Ruba Ha, ADC-T

## 2022-02-28 NOTE — GROUP NOTE
Group Therapy Documentation    PATIENT'S NAME: Giovany Garrett  MRN:   [id number]  [facility]   1990  [id number]  DATE OF SERVICE: 22  START TIME:  9:00 AM  END TIME: 11:00 AM  FACILITATOR(S): Kirit Greenfield LADC; Owen Linda LADC; Ruba Ha  TOPIC: BEH Group Therapy  Number of patients attending the group:  6  Group Length:  2 Hours    Group Therapy Type: Recovery strategies    Summary of Group / Topics Discussed:    Recovery Principles      Group Attendance:  Attended group session    Patient's response to the group topic/interactions:  cooperative with task    Patient appeared to be Attentive.        Client specific details:  Giovany attended AM group. Patient checked in, took part in a group discussion, and helped graduate a peer. Patient was attentive and engaged.

## 2022-02-28 NOTE — GROUP NOTE
Group Therapy Documentation    PATIENT'S NAME: Giovany Garrett  MRN:   [id number]  :   1990  [id number]  DATE OF SERVICE: 22  START TIME: 12:30 PM  END TIME:  2:30 PM  FACILITATOR(S): Owen Linda LADC; Kirit Greenfield LADC  TOPIC: BEH Group Therapy  Number of patients attending the group:  5  Group Length:  2 Hours    Group Therapy Type: Recovery strategies and Emotion processing    Summary of Group / Topics Discussed:    Recovery Principles, Sober coping skills, and Relapse prevention      Group Attendance:  Attended group session    Patient's response to the group topic/interactions:  cooperative with task, did not share thoughts verbally, discussed personal experience with topic and expressed readiness to alter behaviors    Patient appeared to be Actively participating, Attentive and Engaged.        Client specific details:  Giovany gave appropriate feedback..

## 2022-03-01 ENCOUNTER — HOSPITAL ENCOUNTER (OUTPATIENT)
Dept: BEHAVIORAL HEALTH | Facility: CLINIC | Age: 32
End: 2022-03-01
Attending: FAMILY MEDICINE
Payer: COMMERCIAL

## 2022-03-01 VITALS — TEMPERATURE: 98.1 F | OXYGEN SATURATION: 99 %

## 2022-03-01 PROCEDURE — H2035 A/D TX PROGRAM, PER HOUR: HCPCS | Mod: HQ

## 2022-03-01 PROCEDURE — 1002N00001 HC LODGING PLUS FACILITY CHARGE ADULT

## 2022-03-01 NOTE — GROUP NOTE
Group Therapy Documentation    PATIENT'S NAME: Giovany Garrett  MRN:   [id number]  [facility]   1990  [id number]  DATE OF SERVICE: 3/01/22  START TIME:  9:00 AM  END TIME: 11:00 AM  FACILITATOR(S): Ruba Ha; Owen Linda LADC  TOPIC: BEH Group Therapy  Number of patients attending the group:  4  Group Length:  2 Hours    Group Therapy Type: Recovery strategies    Summary of Group / Topics Discussed:    Recovery Principles      Group Attendance:  Attended group session    Patient's response to the group topic/interactions:  cooperative with task    Patient appeared to be Actively participating.        Client specific details:Giovany attended AM group. Patient was attentive and engaged.

## 2022-03-01 NOTE — GROUP NOTE
Group Therapy Documentation    PATIENT'S NAME: Giovany Garrett  MRN:   [id number]  :   1990  [id number]  DATE OF SERVICE: 3/01/22  START TIME: 12:30 PM  END TIME:  2:30 PM  FACILITATOR(S): Ruba Ha; Kirit Greenfield LADC  TOPIC: BEH Group Therapy  Number of patients attending the group:  4  Group Length:  2 Hours    Group Therapy Type: Recovery strategies    Summary of Group / Topics Discussed:    Recovery Principles      Group Attendance:  Attended group session    Patient's response to the group topic/interactions:  cooperative with task    Patient appeared to be Actively participating.        Client specific details:  Giovany attended PM group. Patient was attentive and engaged.

## 2022-03-01 NOTE — GROUP NOTE
Psychoeducation Group Documentation    PATIENT'S NAME: Giovany Garrett  MRN:   [id number]  :   1990  [facility] NUMBER: [id number]  DATE OF SERVICE: 3/01/22  START TIME:  3:00 PM  END TIME:  4:00 PM  FACILITATOR(S): Malaika Agosto LADC; Julio Mccauley LADC; Luanne Cobb  TOPIC: BEH Pyschoeducation  Number of patients attending the group:  18  Group Length:  1 Hours    Skills Group Therapy Type: Healthy behaviors development    Summary of Group / Topics Discussed:    Symptom management skills and Relapse prevention skills      Group Attendance:  Attended group session    Patient's response to the group topic/interactions:  cooperative with task    Patient appeared to be Attentive and Engaged.         Client specific details: Pt listened respectfully to Dr. Garcia's lecture on the neurobiology of addiction and outcomes for pts.

## 2022-03-02 ENCOUNTER — HOSPITAL ENCOUNTER (OUTPATIENT)
Dept: BEHAVIORAL HEALTH | Facility: CLINIC | Age: 32
End: 2022-03-02
Attending: FAMILY MEDICINE
Payer: COMMERCIAL

## 2022-03-02 VITALS — TEMPERATURE: 97.9 F | OXYGEN SATURATION: 100 %

## 2022-03-02 PROCEDURE — 1002N00001 HC LODGING PLUS FACILITY CHARGE ADULT

## 2022-03-02 PROCEDURE — H2035 A/D TX PROGRAM, PER HOUR: HCPCS | Mod: HQ

## 2022-03-02 NOTE — GROUP NOTE
Psychoeducation Group Documentation    PATIENT'S NAME: Giovany Garrett  MRN:   [id number]  :   1990  [facility] NUMBER: [id number]  DATE OF SERVICE: 3/02/22  START TIME:  8:30 AM  END TIME:  9:30 AM  FACILITATOR(S): [facility]; Konrad Valerio, PhD LP  TOPIC: BEH Pyschoeducation  Number of patients attending the group:  4  Group Length:  1 Hour    Skills Group Therapy Type: Emotion regulation skills    Summary of Group / Topics Discussed:    Symptom management skills          Group Attendance:  Attended group session    Patient's response to the group topic/interactions:  cooperative with task and expressed readiness to alter behaviors    Patient appeared to be Actively participating, Attentive and Engaged.         Client specific details:  Giovany gave appropriate feedback..

## 2022-03-02 NOTE — GROUP NOTE
Group Therapy Documentation    PATIENT'S NAME: Giovany Garrett  MRN:   [id number]  [facility]   1990  [id number]  DATE OF SERVICE: 3/02/22  START TIME:  9:45 AM  END TIME: 11:20 AM  FACILITATOR(S): Owen Linda LADC  TOPIC: BEH Group Therapy  Number of patients attending the group:  4  Group Length:  1.5 Hours    Group Therapy Type: Recovery strategies    Summary of Group / Topics Discussed:    Recovery Principles      Group Attendance:  Attended group session    Patient's response to the group topic/interactions:  cooperative with task    Patient appeared to be Actively participating.        Client specific details: Giovany attended AM group. Patient was attentive and engaged..

## 2022-03-02 NOTE — GROUP NOTE
Group Therapy Documentation    PATIENT'S NAME: Giovany Garrett  MRN:   [id number]  :   1990  [id number]  DATE OF SERVICE: 3/02/22  START TIME: 12:30 PM  END TIME:  2:30 PM  FACILITATOR(S): Kirit Greenfield LADC; Ruba Ha  TOPIC: BEH Group Therapy  Number of patients attending the group:  3  Group Length:  2 Hours    Group Therapy Type: Recovery strategies    Summary of Group / Topics Discussed:    Recovery Principles      Group Attendance:  Attended group session    Patient's response to the group topic/interactions:  cooperative with task    Patient appeared to be Actively participating.        Client specific details:  Giovany  attended PM group. Group discussion was on what the next 5 yrs will look like sober versus 5 yrs using. Patient was attentive and engaged.

## 2022-03-02 NOTE — PROGRESS NOTES
Patient has an intake appointment with [facility] scheduled for Wednesday 3/16 @ 11:00 am. Location is [address] [place] [address]. [phone].

## 2022-03-03 ENCOUNTER — HOSPITAL ENCOUNTER (OUTPATIENT)
Dept: BEHAVIORAL HEALTH | Facility: CLINIC | Age: 32
End: 2022-03-03
Attending: FAMILY MEDICINE
Payer: COMMERCIAL

## 2022-03-03 VITALS — OXYGEN SATURATION: 99 % | TEMPERATURE: 97.9 F

## 2022-03-03 PROCEDURE — H2035 A/D TX PROGRAM, PER HOUR: HCPCS | Mod: HQ

## 2022-03-03 PROCEDURE — 1002N00001 HC LODGING PLUS FACILITY CHARGE ADULT

## 2022-03-03 NOTE — GROUP NOTE
Group Therapy Documentation    PATIENT'S NAME: Giovany Garrett  MRN:   [id number]  :   1990  [id number]  DATE OF SERVICE: 3/03/22  START TIME: 12:30 PM  END TIME:  2:30 PM  FACILITATOR(S): Kirit Greenfield LADC; Ashley Posada; Ruba Ha  TOPIC: BEH Group Therapy  Number of patients attending the group:  3  Group Length:  2 Hours    Group Therapy Type: Recovery strategies    Summary of Group / Topics Discussed:    Recovery Principles      Group Attendance:  Attended group session    Patient's response to the group topic/interactions:  cooperative with task    Patient appeared to be Attentive.        Client specific details:  Giovany attended PM Spiritual Care group. Patient was attentive and engaged  .

## 2022-03-03 NOTE — GROUP NOTE
Psychoeducation Group Documentation    PATIENT'S NAME: Giovany Garrett  MRN:   [id number]  :   1990  [facility] NUMBER: [id number]  DATE OF SERVICE: 3/03/22  START TIME:  3:00 PM  END TIME:  4:00 PM  FACILITATOR(S): Julio Mccauley [facility]; Malaika Agosto LADC; Piero Vences [facility]  TOPIC: BEH Pyschoeducation  Number of patients attending the group:  3  Group Length:  1 Hours    Skills Group Therapy Type: Daily living/independence skills    Summary of Group / Topics Discussed:    Balanced lifestyle skills          Group Attendance:  Attended group session    Patient's response to the group topic/interactions:  cooperative with task    Patient appeared to be Attentive.         Client specific details:  Giovany participated in the afternoon skills group on gambling.

## 2022-03-03 NOTE — GROUP NOTE
Group Therapy Documentation    PATIENT'S NAME: Giovany Garrett  MRN:   [id number]  [facility]   1990  [id number]  DATE OF SERVICE: 3/03/22  START TIME:  9:00 AM  END TIME: 11:00 AM  FACILITATOR(S): Owen Linda LADC; Kirit Greenfield LADC; Ruba Ha  TOPIC: BEH Group Therapy  Number of patients attending the group:  3  Group Length:  2 Hours    Group Therapy Type: Recovery strategies    Summary of Group / Topics Discussed:    Recovery Principles      Group Attendance:  Attended group session    Patient's response to the group topic/interactions:  cooperative with task    Patient appeared to be Attentive.        Client specific details:  Giovany attended AM group. Patient checked in, and was attentive and engaged.

## 2022-03-04 ENCOUNTER — HOSPITAL ENCOUNTER (OUTPATIENT)
Dept: BEHAVIORAL HEALTH | Facility: CLINIC | Age: 32
End: 2022-03-04
Attending: FAMILY MEDICINE
Payer: COMMERCIAL

## 2022-03-04 VITALS — OXYGEN SATURATION: 97 % | TEMPERATURE: 97.3 F

## 2022-03-04 PROCEDURE — 1002N00001 HC LODGING PLUS FACILITY CHARGE ADULT

## 2022-03-04 PROCEDURE — H2035 A/D TX PROGRAM, PER HOUR: HCPCS | Mod: HQ

## 2022-03-04 NOTE — GROUP NOTE
Group Therapy Documentation    PATIENT'S NAME: Giovany Garrett  MRN:   [id number]  [facility]   1990  [id number]  DATE OF SERVICE: 3/04/22  START TIME: 12:30 PM  END TIME:  2:30 PM  FACILITATOR(S): Kirit Greenfield LADC  TOPIC: BEH Group Therapy  Number of patients attending the group:  2  Group Length:  2 Hours    Group Therapy Type: Recovery strategies    Summary of Group / Topics Discussed:    Recovery Principles      Group Attendance:  Attended group session    Patient's response to the group topic/interactions:  cooperative with task    Patient appeared to be Attentive.        Client specific details: Giovany attended PM group. Patient was attentive and engaged.

## 2022-03-04 NOTE — GROUP NOTE
Group Therapy Documentation    PATIENT'S NAME: Giovany Garrett  MRN:   [id number]  [facility]   1990  [id number]  DATE OF SERVICE: 3/04/22  START TIME:  9:00 AM  END TIME: 11:00 AM  FACILITATOR(S): Kirit Greenfield LADC  TOPIC: BEH Group Therapy  Number of patients attending the group:  3  Group Length:  2 Hours    Group Therapy Type: Recovery strategies    Summary of Group / Topics Discussed:    Recovery Principles      Group Attendance:  Attended group session    Patient's response to the group topic/interactions:  cooperative with task    Patient appeared to be Attentive.        Client specific details:  Giovany attended AM group.Patient took part in a walking meditation, checked in, and talked about choices he's making in his recovery. Patient was attentive and engaged.

## 2022-03-05 ENCOUNTER — HOSPITAL ENCOUNTER (OUTPATIENT)
Dept: BEHAVIORAL HEALTH | Facility: CLINIC | Age: 32
End: 2022-03-05
Attending: FAMILY MEDICINE
Payer: COMMERCIAL

## 2022-03-05 VITALS — OXYGEN SATURATION: 100 % | TEMPERATURE: 97.7 F

## 2022-03-05 PROCEDURE — 1002N00001 HC LODGING PLUS FACILITY CHARGE ADULT

## 2022-03-05 PROCEDURE — H2035 A/D TX PROGRAM, PER HOUR: HCPCS | Mod: HQ

## 2022-03-05 NOTE — GROUP NOTE
Group Therapy Documentation    PATIENT'S NAME: Giovany Garrett  MRN:   [id number]  [facility]   1990  [id number]  DATE OF SERVICE: 3/05/22  START TIME: 12:30 PM  END TIME:  2:30 PM  FACILITATOR(S): Jeferson Britt LADC; Owen Linda LADC  TOPIC: BEH Group Therapy  Number of patients attending the group:  3  Group Length:  2 Hours    Group Therapy Type: Recovery strategies and Daily living/independence skills    Summary of Group / Topics Discussed:    Recovery Principles, Sober coping skills, and Relapse prevention      Group Attendance:  Attended group session    Patient's response to the group topic/interactions:  cooperative with task and expressed understanding of topic    Patient appeared to be Actively participating, Attentive and Engaged.        Client specific details:  Giovany learned about the relapse process and prevention.

## 2022-03-05 NOTE — GROUP NOTE
Psychoeducation Group Documentation    PATIENT'S NAME: Giovany Garrett  MRN:   [id number]  :   1990  [facility] NUMBER: [id number]  DATE OF SERVICE: 3/05/22  START TIME:  9:00 AM  END TIME: 11:00 AM  FACILITATOR(S): Owen Linda LADC; Jeferson Britt LADC  TOPIC: BEH Pyschoeducation  Number of patients attending the group:  3  Group Length:  2 Hours    Skills Group Therapy Type: Recovery skills    Summary of Group / Topics Discussed:    Relapse prevention skills          Group Attendance:  Attended group session    Patient's response to the group topic/interactions:  cooperative with task and listened actively    Patient appeared to be Actively participating and Attentive.         Client specific details:  Giovany gave appropriate feedback..

## 2022-03-06 ENCOUNTER — HOSPITAL ENCOUNTER (OUTPATIENT)
Dept: BEHAVIORAL HEALTH | Facility: CLINIC | Age: 32
End: 2022-03-06
Attending: FAMILY MEDICINE
Payer: COMMERCIAL

## 2022-03-06 VITALS — OXYGEN SATURATION: 97 % | TEMPERATURE: 98 F

## 2022-03-06 PROCEDURE — 1002N00001 HC LODGING PLUS FACILITY CHARGE ADULT

## 2022-03-06 PROCEDURE — H2035 A/D TX PROGRAM, PER HOUR: HCPCS | Mod: HQ

## 2022-03-06 NOTE — GROUP NOTE
Psychoeducation Group Documentation    PATIENT'S NAME: Giovany Garrett  MRN:   [id number]  :   1990  [facility] NUMBER: [id number]  DATE OF SERVICE: 3/06/22  START TIME:  8:30 AM  END TIME: 10:30 AM  FACILITATOR(S): Owen Linda LADC; Maryjo Vanegas RN  TOPIC: BEH Pyschoeducation  Number of patients attending the group:  3  Group Length:  2 Hours    Skills Group Therapy Type: Healthy behaviors development    Summary of Group / Topics Discussed:    Balanced lifestyle skills          Group Attendance:  Attended group session    Patient's response to the group topic/interactions:  cooperative with task    Patient appeared to be Attentive.         Client specific details:  Giovany gave appropriate feedback..

## 2022-03-06 NOTE — GROUP NOTE
Psychoeducation Group Documentation    PATIENT'S NAME: Giovany Garrett  MRN:   [id number]  :   1990  [facility] NUMBER: [id number]  DATE OF SERVICE: 3/06/22  START TIME: 12:30 PM  END TIME:  1:30 PM  FACILITATOR(S): Owen Linda LADC; Rene Márquez LADC  TOPIC: BEH Pyschoeducation  Number of patients attending the group:  23  Group Length:  1 Hours    Skills Group Therapy Type: Recovery skills    Summary of Group / Topics Discussed:    Relapse prevention skills          Group Attendance:  Attended group session    Patient's response to the group topic/interactions:  cooperative with task    Patient appeared to be Attentive.         Client specific details:  Giovany gave appropriate feedback..

## 2022-03-07 ENCOUNTER — HOSPITAL ENCOUNTER (OUTPATIENT)
Dept: BEHAVIORAL HEALTH | Facility: CLINIC | Age: 32
End: 2022-03-07
Attending: FAMILY MEDICINE
Payer: COMMERCIAL

## 2022-03-07 VITALS — OXYGEN SATURATION: 99 % | TEMPERATURE: 98 F

## 2022-03-07 PROCEDURE — 1002N00001 HC LODGING PLUS FACILITY CHARGE ADULT

## 2022-03-07 PROCEDURE — H2035 A/D TX PROGRAM, PER HOUR: HCPCS | Mod: HQ

## 2022-03-07 NOTE — PROGRESS NOTES
Patient:  Giovany Garrett    ATTENDANCE for the following date span:  2/28/22 - 3/6/22  (date, type, and amount of each treatment service completed)       Day Monday Tuesday Wednesday Thursday Friday Saturday Tino   Group Hours   4 4 4 4 4 4 2   Skills Hours    1 1 1   1   Individual Session (LADC)            Individual Session  (psychotherapy)            Peer-led Recovery Group                        Adult CD Progress Note and Treatment Plan Review     Attendance  Please refer to OP BEH CD Adult Attendance Record Documentation Flowsheet    Support group attended this week: yes    Reporting sobriety:  yes        Treatment Plan     Treatment Plan Review competed on: 3-7-22       Client preferred learning style: Visual  Hands on  Verbal    Staff Members contributing JOSTIN Lopez LADC.                     Received Supervision: No    Client: contributed to goals and plan.    Client received copy of plan/revised plan: Yes    Client agrees with plan/revised plan: Yes        Changes to Treatment Plan: No    New Goals added since last review None needed.    Goals worked on since last review See ASAM notes below.    Strategies effective: yes    Strategies need these changes: None needed.    1) Care Coordination Activities:  None.  2) Medical, Mental Health and other appointments the client attended: None.  3) Medication issues: None.  4) Physical and mental health problems: Pt.reports he has had no issues.  5) Any changes in Vulnerable Adult Status?  No If yes, add to treatment plan and individual abuse prevention plan.  6) Review and evaluation of the individual abuse prevention plan: Current IAPP for this program is adequate for this client          ASAM Risk Rating:    Dimension 1 0 No change.    Dimension 2 0 No change.    Dimension 3 1 Pt.reports he has had no suicidal ideation.Pt.continues to gain insight into how his addiction has affected his mental health. Pt.shared his letter to his 2 yr old son in  group.    Dimension 4 1 Pt.has been attending all lectures and groups. He continues to work on increasing his internal motivation to change his life style to maintain sobriety.    Dimension 5 4 Pt.attended the relapse prevention workshop. He was asked to identify his relapse warning signs and triggers in the areas of people,places,activities and feelings.       Dimension 6 3 Pt.has been attending 12 step meetings. He needs to obtain a sponsor. He will be attending [facility] and [facility] at [facility].       Guide to Risk Ratings for Suicidality:   IDEATION: Active thoughts of suicide? INTENT: Intent to follow on suicide? PLAN: Plan to follow through on suicide? Level of Risk:   IF Yes Yes Yes Patient = High Emergent   IF Yes Yes No Patient = High Urgent/Non-Emergent   IF Yes No No Patient = Moderate Non-Urgent   IF No No   No Patient = Low Risk   The patient's ADDITIONAL RISK FACTORS and lack of PROTECTIVE FACTORS may increase their overall suicide risk ratings.     Patient's/client's current risk rating:  Low Risk    Family Involvement:   SERGIO signed    Data:   offered feedback good insight client did actively participate      Intervention:   Aftercare planning  Behavior modification  Counselor feedback  Education  Emotional management  Group feedback  Relapse prevention  Twelve Step facilitation  Mental health education      Assessment:   Stages of Change Model  Contemplation    Appears/Sounds:  Cooperative  Motivated  Engaged      Plan:  Continue group therapy.      JOSTIN Andrea

## 2022-03-07 NOTE — GROUP NOTE
"Group Therapy Documentation    PATIENT'S NAME: Giovany Garrett  MRN:   [id number]  :   1990  [facility] NUMBER: [id number]  DATE OF SERVICE: 3/07/22  START TIME: 9:00 AM  END TIME: 11:00 AM  FACILITATOR(S): Owen Linda LADC  TOPIC: BEH Group Therapy  Number of patients attending the group:  3  Group Length:  2 Hours    Group Therapy Type: Recovery strategies    Summary of Group / Topics Discussed:    Recovery Principles, Balanced lifestyle, Disease of addiction, and Relapse prevention      Group Attendance:  Attended group session    Patient's response to the group topic/interactions:  cooperative with task, discussed personal experience with topic, expressed readiness to alter behaviors, expressed understanding of topic and listened actively    Patient appeared to be Actively participating, Attentive and Engaged.        Client specific details:  Giovany presented his \" 5 Ways My Addiction Has Affected My Mental Health\" assignment..      "

## 2022-03-07 NOTE — GROUP NOTE
Group Therapy Documentation    PATIENT'S NAME: Giovany Garrett  MRN:   [id number]  :   1990  [facility] NUMBER: [id number]  DATE OF SERVICE: 3/07/22  START TIME: 12:30 PM  END TIME:  2:30 PM  FACILITATOR(S): Owen Linda LADC; Ashley Posada  TOPIC: BEH Group Therapy  Number of patients attending the group:  3  Group Length:  2 Hours    Group Therapy Type: Recovery strategies    Summary of Group / Topics Discussed:    Spiritual Care      Group Attendance:  Attended group session    Patient's response to the group topic/interactions:  cooperative with task    Patient appeared to be Actively participating, Attentive and Engaged.        Client specific details:  Giovany gave appropriate feedback..

## 2022-03-08 ENCOUNTER — HOSPITAL ENCOUNTER (OUTPATIENT)
Dept: BEHAVIORAL HEALTH | Facility: CLINIC | Age: 32
End: 2022-03-08
Attending: FAMILY MEDICINE
Payer: COMMERCIAL

## 2022-03-08 VITALS — TEMPERATURE: 98.2 F | OXYGEN SATURATION: 99 %

## 2022-03-08 PROCEDURE — H2035 A/D TX PROGRAM, PER HOUR: HCPCS | Mod: HQ

## 2022-03-08 PROCEDURE — 1002N00001 HC LODGING PLUS FACILITY CHARGE ADULT

## 2022-03-08 NOTE — GROUP NOTE
Psychoeducation Group Documentation    PATIENT'S NAME: Giovany Garrett  MRN:   [id number]  :   1990  [facility] NUMBER: [id number]  DATE OF SERVICE: 3/08/22  START TIME:  3:00 PM  END TIME:  4:00 PM  FACILITATOR(S): Malaika Agosto LADC; Julio Mccauley LADC; Luanne Cobb  TOPIC: BEH Pyschoeducation  Number of patients attending the group:  21  Group Length:  1 Hours    Skills Group Therapy Type: Emotion regulation skills    Summary of Group / Topics Discussed:    Coping/DBT skills    Group Attendance:  Attended group session    Patient's response to the group topic/interactions:  cooperative with task    Patient appeared to be Attentive and Engaged.         Client specific details: Pt listened respectfully to a presentation on the DBT skills of distress tolerance and mindfulness, and participated in an activity.

## 2022-03-08 NOTE — GROUP NOTE
Group Therapy Documentation    PATIENT'S NAME: Giovany Garrett  MRN:   [id number]  :   1990  [id number]  DATE OF SERVICE: 3/08/22  START TIME:  9:00 AM  END TIME: 11:00 AM  FACILITATOR(S): Libia Ibarra LADC  TOPIC: BEH Group Therapy  Number of patients attending the group: 9  Group Length:  2 Hours    Group Therapy Type: Recovery strategies    Summary of Group / Topics Discussed:    Recovery Principles      Group Attendance:  Attended group session    Patient's response to the group topic/interactions:  cooperative with task    Patient appeared to be Actively participating.        Client specific details:  Giovany attended AM small group. Patent engaged in check ins and discussed topics relevant to maintain sobriety and recovery fulfillment.

## 2022-03-08 NOTE — GROUP NOTE
Group Therapy Documentation    PATIENT'S NAME: Giovany Garrett  MRN:   [id number]  [facility]   1990  [id number]  DATE OF SERVICE: 3/08/22  START TIME: 12:30 PM  END TIME:  2:30 PM  FACILITATOR(S): Dwain Canales LADC; Libia Ibarra LADC  TOPIC: BEH Group Therapy  Number of patients attending the group:  9  Group Length:  2 Hours    Group Therapy Type: Recovery strategies    Summary of Group / Topics Discussed:    Recovery Principles, Balanced lifestyle, and Relapse prevention      Group Attendance:  Attended group session    Patient's response to the group topic/interactions:  cooperative with task    Patient appeared to be Actively participating.        Client specific details:  Giovany attended PM small group. Patient participated in a guest lecture. Patient was able to connect with a former [facility] alumni and hear his story and guidance in building a stable recovery.

## 2022-03-09 ENCOUNTER — HOSPITAL ENCOUNTER (OUTPATIENT)
Dept: BEHAVIORAL HEALTH | Facility: CLINIC | Age: 32
End: 2022-03-09
Attending: FAMILY MEDICINE
Payer: COMMERCIAL

## 2022-03-09 VITALS — OXYGEN SATURATION: 98 % | TEMPERATURE: 98 F

## 2022-03-09 LAB — AMPHETAMINES UR QL SCN: NORMAL

## 2022-03-09 PROCEDURE — 80307 DRUG TEST PRSMV CHEM ANLYZR: CPT | Performed by: FAMILY MEDICINE

## 2022-03-09 PROCEDURE — H2035 A/D TX PROGRAM, PER HOUR: HCPCS | Mod: HQ

## 2022-03-09 PROCEDURE — 1002N00001 HC LODGING PLUS FACILITY CHARGE ADULT

## 2022-03-09 NOTE — GROUP NOTE
Group Therapy Documentation    PATIENT'S NAME: Giovany Garrett  MRN:   [id number]  [facility]   1990  [id number]  DATE OF SERVICE: 3/09/22  START TIME:  9:40 AM  END TIME: 11:30 AM  FACILITATOR(S): Owen Linda LADC  TOPIC: BEH Group Therapy  Number of patients attending the group:  3  Group Length:  2 Hours    Group Therapy Type: Recovery strategies and Emotion processing    Summary of Group / Topics Discussed:    Recovery Principles, Disease of addiction, and Relapse prevention      Group Attendance:  Attended group session    Patient's response to the group topic/interactions:  cooperative with task, discussed personal experience with topic, expressed readiness to alter behaviors and listened actively    Patient appeared to be Actively participating, Attentive and Engaged.        Client specific details:  Giovany gave appropriate feedback..

## 2022-03-09 NOTE — GROUP NOTE
Psychoeducation Group Documentation    PATIENT'S NAME: Giovany Garrett  MRN:   [id number]  :   1990  [facility] NUMBER: [id number]  DATE OF SERVICE: 3/09/22  START TIME:  8:30 AM  END TIME:  9:30 AM  FACILITATOR(S): Owen Linda LADC; Dwain Canales LADC  TOPIC: BEH Pyschoeducation  Number of patients attending the group:  3  Group Length:  1 Hours    Skills Group Therapy Type: Recovery skills    Summary of Group / Topics Discussed:    Relapse prevention skills          Group Attendance:  Attended group session    Patient's response to the group topic/interactions:  cooperative with task    Patient appeared to be Attentive.         Client specific details:  Giovany gave appropriate feedback..

## 2022-03-09 NOTE — GROUP NOTE
Group Therapy Documentation    PATIENT'S NAME: Giovany Garrett  MRN:   [id number]  [facility]   1990  [id number]  DATE OF SERVICE: 3/09/22  START TIME: 12:30 PM  END TIME:  2:30 PM  FACILITATOR(S): Dwain Canales LADC  TOPIC: BEH Group Therapy  Number of patients attending the group:  7  Group Length:  2 Hours    Group Therapy Type: Recovery strategies    Summary of Group / Topics Discussed:    Sober coping skills      Group Attendance:  Attended group session    Patient's response to the group topic/interactions:  cooperative with task    Patient appeared to be Actively participating.        Client specific details:  Giovany participated and interacted appropriately with peers and staff in PM group. No triggers to use noted or discussed.

## 2022-03-10 ENCOUNTER — HOSPITAL ENCOUNTER (OUTPATIENT)
Dept: BEHAVIORAL HEALTH | Facility: CLINIC | Age: 32
Discharge: HOME | End: 2022-03-10
Attending: FAMILY MEDICINE
Payer: COMMERCIAL

## 2022-03-10 VITALS — TEMPERATURE: 97.9 F | OXYGEN SATURATION: 99 %

## 2022-03-10 PROCEDURE — H2035 A/D TX PROGRAM, PER HOUR: HCPCS | Mod: HQ

## 2022-03-10 PROCEDURE — 1002N00001 HC LODGING PLUS FACILITY CHARGE ADULT

## 2022-03-10 NOTE — GROUP NOTE
Group Therapy Documentation    PATIENT'S NAME: Giovany Garrett  MRN:   [id number]  [facility]   1990  [id number]  DATE OF SERVICE: 3/10/22  START TIME:  9:00 AM  END TIME: 10:00 AM  FACILITATOR(S): Libia Ibarra LADC  TOPIC: BEH Group Therapy  Number of patients attending the group:  6  Group Length:  2 Hours    Group Therapy Type: Recovery strategies    Summary of Group / Topics Discussed:    Recovery Principles, Disease of addiction, and Emotions/expression      Group Attendance:  Attended group session    Patient's response to the group topic/interactions:  cooperative with task    Patient appeared to be Actively participating.        Client specific details:  Giovany attended AM small group.Patient participated in group check ins and the question of the day. Patient engaged in a graduation ceremony for a peer, and offered feedback.

## 2022-03-10 NOTE — GROUP NOTE
Group Therapy Documentation    PATIENT'S NAME: Giovany Garrett  MRN:   [id number]  [facility]   1990  [id number]  DATE OF SERVICE: 3/10/22  START TIME: 12:30 PM  END TIME:  2:30 PM  FACILITATOR(S): Dwain Canales LADC  TOPIC: BEH Group Therapy  Number of patients attending the group:  6  Group Length:  2 Hours    Group Therapy Type: Recovery strategies    Summary of Group / Topics Discussed:    Recovery Principles      Group Attendance:  Attended group session    Patient's response to the group topic/interactions:  cooperative with task    Patient appeared to be Actively participating.        Client specific details:  Giovany participated and interacted appropriately with peers and staff in PM group. No triggers to use noted or discussed.

## 2022-03-11 ENCOUNTER — HOSPITAL ENCOUNTER (OUTPATIENT)
Dept: BEHAVIORAL HEALTH | Facility: CLINIC | Age: 32
Discharge: HOME | End: 2022-03-11
Attending: FAMILY MEDICINE
Payer: COMMERCIAL

## 2022-03-11 VITALS — OXYGEN SATURATION: 100 % | TEMPERATURE: 97.8 F

## 2022-03-11 PROCEDURE — 1002N00001 HC LODGING PLUS FACILITY CHARGE ADULT

## 2022-03-11 PROCEDURE — H2035 A/D TX PROGRAM, PER HOUR: HCPCS | Mod: HQ

## 2022-03-11 NOTE — GROUP NOTE
"Group Therapy Documentation    PATIENT'S NAME: Giovany Garrett  MRN:   [id number]  :   1990  [facility] NUMBER: [id number]  DATE OF SERVICE: 3/11/22  START TIME:  9:00 AM  END TIME: 11:00 AM  FACILITATOR(S): Libia Ibarra LADC  TOPIC: BEH Group Therapy  Number of patients attending the group:  8  Group Length:  2 Hours    Group Therapy Type: Recovery strategies    Summary of Group / Topics Discussed:    Recovery Principles and Music Therapy      Group Attendance:  Attended group session    Patient's response to the group topic/interactions:  cooperative with task    Patient appeared to be Actively participating.        Client specific details:  Giovany attended AM small group. Patient participated in group check ins and the question of the day. Patients engaged in a discussion about the reading from \"One Step In Front of the Other.\" Patients shared personal experiences. Patient listened to peer selected music and shared feelings and memories about music and how it was helpful in processing emotions. No other concerns reported.       "

## 2022-03-11 NOTE — GROUP NOTE
Group Therapy Documentation    PATIENT'S NAME: Giovany Garrett  MRN:   [id number]  :   1990  [id number]  DATE OF SERVICE: 3/11/22  START TIME: 12:30 PM  END TIME:  2:30 PM  FACILITATOR(S): Dwain Canales LADC  TOPIC: BEH Group Therapy  Number of patients attending the group:  7  Group Length:  2 Hours    Group Therapy Type: Health and wellbeing     Summary of Group / Topics Discussed:    Disease of addiction      Group Attendance:  Attended group session    Patient's response to the group topic/interactions:  cooperative with task    Patient appeared to be Actively participating.        Client specific details:  Giovany participated and interacted appropriately with peers and staff in PM group. No triggers to use noted or discussed.

## 2022-03-12 ENCOUNTER — HOSPITAL ENCOUNTER (OUTPATIENT)
Dept: BEHAVIORAL HEALTH | Facility: CLINIC | Age: 32
Discharge: HOME | End: 2022-03-12
Attending: FAMILY MEDICINE
Payer: COMMERCIAL

## 2022-03-12 VITALS — TEMPERATURE: 97.8 F | OXYGEN SATURATION: 99 %

## 2022-03-12 PROCEDURE — H2035 A/D TX PROGRAM, PER HOUR: HCPCS | Mod: HQ

## 2022-03-12 PROCEDURE — 1002N00001 HC LODGING PLUS FACILITY CHARGE ADULT

## 2022-03-12 NOTE — GROUP NOTE
Group Therapy Documentation    PATIENT'S NAME: Giovany Garrett  MRN:   [id number]  [facility]   1990  [id number]  DATE OF SERVICE: 3/12/22  START TIME: 12:30 PM  END TIME:  2:30 PM  FACILITATOR(S): Libia Ibarra LADC; Luanne Cobb; Malaika Agosto LADC  TOPIC: BEH Group Therapy  Number of patients attending the group:  19  Group Length:  2 Hours    Group Therapy Type: Daily living/independence skills    Summary of Group / Topics Discussed:    Relationship/socialization, Balanced lifestyle, and Relapse prevention    Group Attendance:  Attended group session    Patient's response to the group topic/interactions:  cooperative with task    Patient appeared to be Actively participating, Attentive and Engaged.        Client specific details: Pt listened respectfully to a lecture on communication skills, then participated in in an activity that implemented them.

## 2022-03-12 NOTE — GROUP NOTE
"Group Therapy Documentation    PATIENT'S NAME: Giovany Garrett  MRN:   [id number]  :   1990  [facility] NUMBER: [id number]  DATE OF SERVICE: 3/12/22  START TIME:  9:00 AM  END TIME: 11:00 AM  FACILITATOR(S): Luanne Cobb; Libia Ibarra LADC; Malaika Agosto LADC  TOPIC: BEH Group Therapy  Number of patients attending the group:  20  Group Length:  2 Hours    Group Therapy Type: Recovery strategies    Summary of Group / Topics Discussed:    Relationship/socialization, Disease of addiction, and Relapse prevention    Group Attendance:  Attended group session    Patient's response to the group topic/interactions:  cooperative with task and discussed personal experience with topic    Patient appeared to be Actively participating, Attentive and Engaged.        Client specific details: Pt participated in the \"Voice of Addiction\" and \"Consequences and Rewards\" activities.  "

## 2022-03-12 NOTE — PROGRESS NOTES
Nursing Discharge Planning Meeting    Writer completed discharge planning meeting with patient. Discharge is planned for 3/15/22    Discussed appropriate follow up care to manage JADA, MH and medical and to obtain medication refills. Patient given a copy of their current medications for reference. Questions were answered at this time and the patient verbalized an understanding of the post-discharge follow up plan.    Patient to schedule an appointment with their PCP     Marli ARELLANO MD  Family practice physician in [place], [place]  Address: [address] [place] [address]  Phone: [phone]    Pt will ask above provider for Cardiology referral    Pt given 2 other resources for care:  Dental:  [facility]  Address: [address], [place] [address]  Phone: [phone]    Addiction Medicine  Dr Balwinder Lr  [facility]  [address], [place] [address] [phone]    Continue to support patient in discharge planning as needed to assure appropriate continuity of care.     Tobacco Cessation  Patient participated in the nicotine replacement therapy for tobacco cessation or reduction during their treatment programming: yes, nicorette gum, but pt is not interested in tobacco cessation at this time    The patient was provided with community resources for follow-up to continue tobacco cessation support once in the community. Also the patient was encouraged to discuss their tobacco cessation efforts with the primary care provider.

## 2022-03-13 ENCOUNTER — HOSPITAL ENCOUNTER (OUTPATIENT)
Dept: BEHAVIORAL HEALTH | Facility: CLINIC | Age: 32
Discharge: HOME | End: 2022-03-13
Attending: FAMILY MEDICINE
Payer: COMMERCIAL

## 2022-03-13 VITALS — OXYGEN SATURATION: 98 % | TEMPERATURE: 98.2 F

## 2022-03-13 DIAGNOSIS — F19.10 DRUG ABUSE (H): Primary | ICD-10-CM

## 2022-03-13 LAB — AMPHETAMINES UR QL SCN: NORMAL

## 2022-03-13 PROCEDURE — H2035 A/D TX PROGRAM, PER HOUR: HCPCS | Mod: HQ

## 2022-03-13 PROCEDURE — 1002N00001 HC LODGING PLUS FACILITY CHARGE ADULT

## 2022-03-13 PROCEDURE — 80307 DRUG TEST PRSMV CHEM ANLYZR: CPT | Performed by: FAMILY MEDICINE

## 2022-03-13 NOTE — GROUP NOTE
"Group Therapy Documentation    PATIENT'S NAME: Giovany aGrrett  MRN:   [id number]  :   1990  [facility] NUMBER: [id number]  DATE OF SERVICE: 3/13/22  START TIME: 12:30 PM  END TIME:  1:30 PM  FACILITATOR(S): Luanne Cobb  TOPIC: BEH Group Therapy  Number of patients attending the group:  20    Group Length:  1 Hour    Group Therapy Type: Health and wellbeing     Summary of Group / Topics Discussed:    Self-care activities      Group Attendance:  Attended group session    Patient's response to the group topic/interactions:  cooperative with task    Patient appeared to be Actively participating, Attentive and Engaged.        Client specific details:  Patient was attentive and participative during \"Sexuality Information\" lecture.      "

## 2022-03-13 NOTE — GROUP NOTE
Psychoeducation Group Documentation    PATIENT'S NAME: Giovany Garrett  MRN:   [id number]  :   1990  ACCT. NUMBER: [id number]  DATE OF SERVICE: 3/13/22  START TIME:  8:45 AM  END TIME: 10:45 AM  FACILITATOR(S): Amena Day RN; Luanne Cobb; Malaika Agosto LADC  TOPIC: BEH Pyschoeducation  Number of patients attending the group:  2  Group Length:  2 Hours    Skills Group Therapy Type: Healthy behaviors development    Summary of Group / Topics Discussed:    Balanced lifestyle skills and Relapse prevention skills    Group Attendance:  Attended group session    Patient's response to the group topic/interactions:  cooperative with task    Patient appeared to be Attentive and Engaged.         Client specific details: Pt listened respectfully to a presentation on STIs, pregnancy and substance use, and participated in a group activity designed to reduce stigma.

## 2022-03-14 ENCOUNTER — HOSPITAL ENCOUNTER (OUTPATIENT)
Dept: BEHAVIORAL HEALTH | Facility: CLINIC | Age: 32
Discharge: HOME | End: 2022-03-14
Attending: FAMILY MEDICINE
Payer: COMMERCIAL

## 2022-03-14 VITALS — OXYGEN SATURATION: 96 % | TEMPERATURE: 98.2 F

## 2022-03-14 PROCEDURE — H2035 A/D TX PROGRAM, PER HOUR: HCPCS

## 2022-03-14 PROCEDURE — 1002N00001 HC LODGING PLUS FACILITY CHARGE ADULT

## 2022-03-14 PROCEDURE — H2035 A/D TX PROGRAM, PER HOUR: HCPCS | Mod: HQ

## 2022-03-14 NOTE — GROUP NOTE
Group Therapy Documentation    PATIENT'S NAME: Giovany Garrett  MRN:   [id number]  :   1990  [id number]  DATE OF SERVICE: 3/14/22  START TIME: 12:30 PM  END TIME:  2:30 PM  FACILITATOR(S): Chele Dias LADC  TOPIC: BEH Group Therapy  Number of patients attending the group:  7  Group Length:  2 Hours    Group Therapy Type: Recovery strategies    Summary of Group / Topics Discussed:    Recovery Principles, Sober coping skills, Relationship/socialization, Disease of addiction, and Emotions/expression      Group Attendance:  Attended group session    Patient's response to the group topic/interactions:  cooperative with task    Patient appeared to be Actively participating.        Client specific details:  Patient presented his use history. Initially seemed flippant but as he talked about his family he became much more emotionally honest. Patient received feedback that he needs to reboot his entire life with consideration of limiting contact with family members and, although he does not view alcohol as a problem, that he not use alcohol.

## 2022-03-14 NOTE — GROUP NOTE
Group Therapy Documentation    PATIENT'S NAME: Giovany Garrett  MRN:   [id number]  :   1990  [id number]  DATE OF SERVICE: 3/14/22  START TIME:  9:00 AM  END TIME: 11:00 AM  FACILITATOR(S): Chele Dias LADC  TOPIC: BEH Group Therapy  Number of patients attending the group:  7  Group Length:  2 Hours    Group Therapy Type: Recovery strategies    Summary of Group / Topics Discussed:    Recovery Principles, Sober coping skills, Balanced lifestyle, Co-occurring illnesses symptom management, and Disease of addiction      Group Attendance:  Attended group session    Patient's response to the group topic/interactions:  cooperative with task    Patient appeared to be Actively participating.        Client specific details:  Patient gave feedback to others.

## 2022-03-15 NOTE — PROGRESS NOTES
Patient:  Giovany Garrett    ATTENDANCE for the following date span:  3/7/22-3/13/22  (date, type, and amount of each treatment service completed)       Day Monday Tuesday Wednesday Thursday Friday Saturday Tino   Group Hours   4 4 4 4 4 4 2   Skills Hours   0 1 1 1 0 0 1   Individual Session (LADC)   0 0 0 0 0 0 0   Individual Session  (psychotherapy)   0 0 0 0 0 0 0   Peer-led Recovery Group   1 1 1 1 1 1 1               Adult CD Progress Note and Treatment Plan Review     Attendance  Please refer to OP BEH CD Adult Attendance Record Documentation Flowsheet    Support group attended this week: yes    Reporting sobriety:  yes        Treatment Plan     Treatment Plan Review competed on: 3-7-22       Client preferred learning style: Visual  Hands on  Verbal    Staff Members contributing: JOSTIN Rizo; JOSTIN Rivas                    Received Supervision: No    Client: contributed to goals and plan.    Client received copy of plan/revised plan: Yes    Client agrees with plan/revised plan: Yes        Changes to Treatment Plan: No    New Goals added since last review None needed.    Goals worked on since last review See ASAM notes below.    Strategies effective: yes    Strategies need these changes: None needed.    1) Care Coordination Activities:  Patient finalized IOP/Sober living plans with CM.   2) Medical, Mental Health and other appointments the client attended: None.  3) Medication issues: None.  4) Physical and mental health problems: Pt.reports he has had no issues.  5) Any changes in Vulnerable Adult Status?  No If yes, add to treatment plan and individual abuse prevention plan.  6) Review and evaluation of the individual abuse prevention plan: Current IAPP for this program is adequate for this client          ASAM Risk Rating:    Dimension 1 0 No change.    Dimension 2 0 No change.    Dimension 3 1 Pt.reports he has had no suicidal ideation.Pt.continues to gain insight into how his addiction  has affected his mental health. Pt continued to process his upcoming gradation and transition to [facility]. Patient expressed being proud and healthier then when he arrived at . Patient reported excitement, anxiety, but feeling accomplished on his treatment experiment.     Dimension 4 1 Pt.has been attending all lectures and groups. He continues to work on increasing his internal motivation to change his life style to maintain sobriety. Patient reported reading, and doing deep breathing when feeling stressed or needing to be mindful in the moment.     Dimension 5 4 Pt.attended the relapse prevention workshop. He was asked to identify his relapse warning signs and triggers in the areas of people,places,activities and feelings.   Patient reported his cravings at 2/10. And being able to manage with his coping skills.     Dimension 6 3 Pt.has been attending 12 step meetings. He needs to obtain a sponsor. He will be attending [facility] and [facility] at [facility].       Guide to Risk Ratings for Suicidality:   IDEATION: Active thoughts of suicide? INTENT: Intent to follow on suicide? PLAN: Plan to follow through on suicide? Level of Risk:   IF Yes Yes Yes Patient = High Emergent   IF Yes Yes No Patient = High Urgent/Non-Emergent   IF Yes No No Patient = Moderate Non-Urgent   IF No No   No Patient = Low Risk   The patient's ADDITIONAL RISK FACTORS and lack of PROTECTIVE FACTORS may increase their overall suicide risk ratings.     Patient's/client's current risk rating:  Low Risk    Family Involvement:   SERGIO signed    Data:   offered feedback good insight client did actively participate      Intervention:   Aftercare planning  Behavior modification  Counselor feedback  Education  Emotional management  Group feedback  Relapse prevention  Twelve Step facilitation  Mental health education      Assessment:   Stages of Change Model  Contemplation    Appears/Sounds:  Cooperative  Motivated  Engaged      Plan:  Continue group  therapy.      MARLIN Byrnes

## 2022-03-15 NOTE — ADDENDUM NOTE
Encounter addended by: Libia Ibarra LADC on: 3/15/2022 1:41 PM   Actions taken: Clinical Note Signed

## 2022-03-15 NOTE — ADDENDUM NOTE
Encounter addended by: Dwain Canales LADC on: 3/15/2022 9:00 AM   Actions taken: Clinical Note Signed

## 2022-03-15 NOTE — ADDENDUM NOTE
Encounter addended by: Libia Ibarra LADC on: 3/15/2022 11:14 AM   Actions taken: Clinical Note Signed

## 2022-03-15 NOTE — PROGRESS NOTES
[address]oors  [place] [address]          Giovany Garrett, 1990, was admitted for evaluation/treatment of chemical dependency at [facility].  This person took part in these program(s):    ______ The Inpatient Program   ______ The Outpatient Program   ___X__ The [facility]   ______ [facility] Outpatient       Date admitted: 2/15/2022  Date discharged: 3/15/2022    Type of discharge:   ___X___ Satisfactory - completed evaluation / treatment   ______ Discharged without completing   ______ Behavioral discharge   ______ Transferred to another chemical dependency program   ______ Transferred to another type of service   ______ Left against medical advice (AMA) / Eloped             Counselor: JOSTIN Lindo and JOSTIN Rizo                          Date: 3/15/2022             Time: 8:49 AM

## 2022-03-15 NOTE — PROGRESS NOTES
[facility] Discharge Summary/Instructions     Patient: Giovany Garrett  MRN: [id number]   : 1990 Age: 31 year old Sex: male   -  Focus of Treatment / Discharge Recommendations    Personal Safety/ Management of Symptoms    * Follow your safety plan.  Report increased symptoms to your care team                    and /or go to the nearest Emergency Department.    * Call crisis lines as needed    [facility] [phone]               [facility] [phone]  [facility] [phone]              [facility] [phone]  [facility] [phone]              [facility] [phone]  [facility] [phone]          National Suicide Prevention [phone]  [facility] [phone]            Suicide Prevention [phone]  [facility] [phone]    Abstinence/Relapse Prevention  * Take all medicines as directed.  Carry a current list of medicines with you.  * Use coping skills.  * Do not use illicit (street) drugs, controlled substances (narcotics) or alcohol.    Develop/Improve Independent Living/Socialization Skills.    Community Resources/Supports and Discharge Planning:   Go to group therapy and / or support groups at: [facility].          Client Signature:_______________________   Date / Time:___________  Staff Signature:________________________   Date / Time:___________

## 2022-03-15 NOTE — PROGRESS NOTES
CHEMICAL DEPENDENCY DISCHARGE SUMMARY    PATIENT NAME:  Giovany Garrett   :  1990    EVALUATION COUNSELOR: JOSTIN Hernandez  TREATMENT COUNSELORS: Libia Ibarra [facility]; [facility]  REFERRAL SOURCE:  JOSTIN Singh  PROGRAM:  [facility] Chemical Dependency [facility]  ADMISSION DATE: 2/15/22  DATE OF LAST SESSION: 3/13/22  DISCHARGE DATE: 3/14/22  ADMISSION DIAGNOSIS:    Stimulant Use Disorder ( Methamphetamine) -F15.20  DISCHARGE DIAGNOSIS:  Stimulant Use Disorder ( Methamphetamine) -F15.20    DISCHARGE STATUS: Successful Completion  LAST USE DATE: Patient reported last date of use as 22  DAYS OF TREATMENT COMPLETED:  Patient completed 28 days of treatment    PRESENTING INFORMATION:  Patient reported inability to stop substances use, despite attempts to control or cut down usage. Patient was given a substance use assessment, and met criteria for inpatient residential treatment, and was admitted to [facility].     READMITTANCE INFORMATION: Patient will need to wait a minimum of 30 days prior to readmittance, from the date of discharge.     SERVICES PROVIDED:  Our services included assessment, treatment planning and education regarding chemical dependency, mental illness, relationships, and relapse prevention.  The patient also participated in individual therapy, group therapy, recovery oriented workshops, spiritual care counseling, recovery skills training and aftercare planning.    ISSUES ADDRESS IN TREATMENT:    DIMENSION 1 - ACUTE INTOXICATION/WITHDRAWAL POTENTIAL  ADMISSION RISK RATIN  DISCHARGE RISK RATIN  Patient entered into [facility] on 2/15/22. Patient reported substances of use as Methamphetamine. Patient reported last date of use as 22. Throughout treatment patient denied any withdrawal symptoms that would interfere with full participation in treatment programming.     DIMENSION 2 - BIOMEDICAL COMPLICATIONS AND  "CONDITIONS  ADMISSION RISK RATIN  DISCHARGE RISK RATIN  Upon admissions patient denied any medical concerns that would interfere with full participation in treatment programming. Patient reported not having a PCP. Patient maintain medication compliance throughout treatment. No concerns were reported during treatment. Upon discharge patient appeared able to access medical aid as needed.     DIMENSION 3 - EMOTIONAL, BEHAVIORAL, COGNITIVE CONDITIONS AND COMPLICATIONS  ADMISSION RISK RATIN  DISCHARGE RISK RATIN  Upon admissions patient reported no formal mental health dx.  Patient denied the need to meet with staff therapist while in treatment. Upon admissions patient was given a suicidal risk screening. Patient was rated as \"Very Low Risk.\".  Patent attended groups daily that addressed processing emotions and intense feelings. Patient developed a treatment plan to address resentments, relationships, emotional regulation, and healthy boundary setting/maintaining. Patient completed assignments in these areas, and shared with peers, and received feedback. Patient explored areas in which his lack of stability of his mental health was fueled by continued use. Patient reported avoidence of concerns was not beneficial. Patient shared how he feels very different leaving treatment, then when he first arrived. Patient reported his ability to address his anger and resentments towards family and his child's mother has been a big turning point for him to really focus on his own life and getting sober. Patient reported he thinks things through more, and is less impulsive, and attempts to plan things out.  Patient reported feeling grateful for the added skills he gained to help with his emotions. Patient reported no SI/HI in treatment.     DIMENSION 4 - READINESS FOR CHANGE  ADMISSION RISK RATIN  DISCHARGE RISK RATIN  Upon admission, patient reported his motivation for treatment was, \" to have a good life, " "and be able to see my son.\" Patient reported being internally motivated to live a sober and healthy life. Patient reported his relationships with his child's mother, and the boundaries she had set about him getting help with his use, in order to see his son, was a life changing situation for him. Patient attended and remained engaged throughout treatment. Patient was able to work though the challenges of changing life patterns and behaviors. Patient appeared to be in the contemplation stage upon discharge, and his ability to adapt and implement skills as he transitions will coincide with the Action Stage of change.     DIMENSION 5 - RELAPSE, CONTINUED USE AND CONTINUED PROBLEM POTENTIAL   ADMISSION RISK RATIN  DISCHARGE RISK RATING: 3  Patient reported attending several previous treatments. Patient stated his longest period of sobriety was 3 years. Patient attributed his lack of sustained sobriety was due to not addressing life's concerns head on. Patient reported his awareness to his responses to intense emotions, impulsiveness, and lack of coping skill implementation was the reasoning for continued use and relapses. Patient completed assignments addressing prevention, coping skills, consequences of use, and skill implementation. Patient reported gaining skills that would be helpful daily. Patient processed with peers and staff the consequences of his use and how those ripple out to loved ones and long term plans.       DIMENSION 6 - RECOVERY ENVIRONMENT  ADMISSION RISK RATING: 3  DISCHARGE RISK RATING: 3  Upon discharge, patient reported he plans to attend [facility] and stay at [facility]. Patient plans to attend meetings weekly and connect with a possible sponsor to keep him accountable. Patient reported having a positive UA at work prior to coming to treatment; he plans to address employment, and seek new if needed. Patient reported no current legals. Patient reported he plans to stay " connected to supportive people and continue to work towards getting back to seeing his son frequently.     STRENGTHS: Patient maintained a positive attitude while in treatment. Patient was an active participant in group therapy and was always open for feedback from his counselors and peers. Patient appears motivated for recovery at this time and willing to incorporate positive changes into his  life.     LIVING ARRANGEMENTS AT DISCHARGE: [place]    PROGNOSIS:  Prognosis for this patient is GUARDED at this time. Due to several past treatments, and short stints of sobriety.     CONTINUING CARE RECOMMENDATIONS AND REFERRALS:    1.  Abstain from all mood-altering chemicals unless prescribed by a licensed medical provider, and take all medications as prescribed.  2.  Attend a minimum of three AA/NA/Sober support groups weekly in the community.  3.  Maintain regular contact with your sponsor. Obtain a permanent male sponsor and maintain regular contact with him.  4.  Admit immediately into [facility] and follow all recommendations.  5.  Continue to invest in building a sober support network.  6.  Continue to monitor and understand relapse triggers and stressors through the use and development of healthy coping skills.  7.  Obtain a mental health therapist and attend all scheduled programming  8. Attend all scheduled medical appointments.  9. Take medication as prescribed       This information has been disclosed to you from records protected by Federal confidentiality rules (42 CFR part 2). The Federal rules prohibit you from making any further disclosure of this information unless further disclosure is expressly permitted by the written consent of the person to whom it pertains or as otherwise permitted by 42 CFR part 2. A general authorization for the release of medical or other information is NOT sufficient for this purpose. The Federal rules restrict any use of the information to criminally  investigate or prosecute any alcohol or drug abuse patient.       JOSTIN Rizo

## 2022-03-16 NOTE — PROGRESS NOTES
INDIVIDUAL SESSION SUMMARY    D) Met with client on 3/14/22 from 12:30-1:30pm. Client is in the [facility] program.  Client's Statement of Presenting Concern:  Client spoke of stressors including: financial hardship, lack of social support, housing instability and legal issues. Client reported mood has been: determined.     Social History:  Client spoke about childhood including growing up with both parents and an older brother.   Client reported their relationship status as: single; that he was in an on/off relaitonship with the mother of his son for 7 years.   Client reported to have a 2 yr old son.    Client reported their housing is: homeless.   Client reported that their employment status is: unemployed.   Client identified stable and meaningful social connections including: mom and dad.     Mental Health History:  Client reported to have mental health provider: n/a.   Safety: denies current suicidal or homicidal ideation, plan, or intent.    Chemical Health History:  Client reported that he completed this program at age 21 and met his S.O. in another treatment program at age 23.     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     Medical Issues:  Client reports no current medical concerns. Client reported no issues with sleep.    Patient's Strengths and Limitations:  Client identified the following strengths or resources that will help them succeed in counseling: family support, positive work environment, motivation, sober support group / recovery support , sponsor and work ethic. Client identified the following supports: family, support group and sober support group / recovery support . Things that may interfere with the clients success in  counseling include: financial hardship, lack of social support, unsupportive environment and legal issues. Client spoke about their aftercare plans including: IOP at [facility], sober living, 12-step meetings and working with a sponsor.     I) Individual session with client. Provided client with verbal interventions including: validation, nurturing, compassion and support. Discussed the importance of recovery behaviors such as utilizing sponsorship, sober support network, going to 12-step meetings, daily rituals, and goal setting. Provided referrals/resources for individual therapists in [facility].    A) Client appears to have trouble identifying emotions and to lack skills for emotional regulation and stress management. Client appears to have strong motivation and would benefit from continued support with a focus on: emotional regulation, impulse control, relapse prevention, increasing resiliency and self-esteem.      P) No future sessions scheduled. Client has been provided with therapy referrals in the community or will resume sessions with prior community based therapist.   Ele Cazares, SANTOSH  3/16/2022

## 2022-08-04 ENCOUNTER — HOSPITAL ENCOUNTER (OUTPATIENT)
Dept: BEHAVIORAL HEALTH | Facility: CLINIC | Age: 32
Discharge: HOME | End: 2022-08-04
Attending: FAMILY MEDICINE | Admitting: FAMILY MEDICINE
Payer: COMMERCIAL

## 2022-08-04 VITALS — BODY MASS INDEX: 23.8 KG/M2 | HEIGHT: 71 IN | WEIGHT: 170 LBS

## 2022-08-04 PROCEDURE — H0001 ALCOHOL AND/OR DRUG ASSESS: HCPCS | Mod: TEL,95

## 2022-08-04 ASSESSMENT — PAIN SCALES - GENERAL: PAINLEVEL: NO PAIN (0)

## 2022-08-04 NOTE — PROGRESS NOTES
Type Of Assessment: Comprehensive Assessment Update    Referral Source:  Self  MRN: [id number]    DATE OF SERVICE: 2022  Date of previous JADA Assessment: 22 with Jeronimo (others for sober housing)  Patient confirmed identity through two factor verification:  and SSN    PATIENT'S NAME: Giovany Garrett  Age: 31 year old  Last 4 SSN: [id number]  Sex: male   Gender Identity: male  Sexual Orientation: Heterosexual  Cultural Background: No, Denies any cultural influences or concerns that need to be considered for treatment  YOB: 1990  Current Address:   [address]  [place] [address]  Patient Phone Number:  [phone]   Patient's E-Mail Contact:  [email or web address]  Funding: Screen  PMI: [id number]  Emergency Contact: Patient declined to provide.  DAANES information was provided to patient and patient does not want a copy.     Telemedicine Visit: The patient's condition can be safely assessed and treated via synchronous audio and visual telemedicine encounter.    Reason for Telemedicine Visit: Patient has requested telehealth visit  Originating Site (Patient Location):  [address] [place] [address], patient said he is homeless  Distant Site (Provider Location): Provider Remote Setting- Home Office  Consent:  The patient/guardian has verbally consented to: the potential risks and benefits of telemedicine (video visit) versus in person care; bill my insurance or make self-payment for services provided; and responsibility for payment of non-covered services.   Mode of Communication:  Video Conference     START TIME: 10:20 am  END TIME: 11:09 am    As the provider I attest to compliance with applicable laws and regulations related to telemedicine.   Giovany Garrett was seen for a substance use disorder consult on 2022 by JOSTIN Guerin.    Reason for Substance Use Disorder Consult:  Patient is self-referred for an updated chemical dependency assessment.  Patient said that he was  "living in sober housing and that he relapsed 2 months ago.  Patient is seeking inpatient treatment as he has been unable to control his use and \"needs to be somewhere so I can't get anything.\"  Patient said that he wants to return to a program called [facility] ([facility] in [place] which has longer term programming that will help provide stability.  Patient said he feels hopeless, but denies any SI at this time.    Are you currently having severe withdrawal symptoms that are putting yourself or others in danger? No  Are you currently having severe medical problems that require immediate attention? No  Are you currently having severe emotional or behavioral problems that are putting yourself or others at risk of harm? No    Have you participated in prior substance use disorder evaluations? Yes. When, Where, and What circumstances: Patient has had multiple treatment episodes (8+ inpatient/outpatient) at various treatment facilities throughout the [facility].   Have you ever been to detox, inpatient or outpatient treatment for substance related use? List previous treatment: Yes. When, Where, and What circumstances: See above.   Have you ever had a gambling problem or had treatment for compulsive gambling? No  Patient does not appear to be in severe withdrawal, an imminent safety risk to self or others, or requiring immediate medical attention and may proceed with the assessment interview.    Comprehensive Substance Use History   X X = Primary Drug Used Age of First Use    Pattern of Substance Use   (heaviest use in life and a use history within the past year if applicable) (DSM-5: Sx #3) Date /  Quantity of last use if within the past 30 days Withdrawal Potential?   Method of use  (Oral, smoked, snorted, IV, etc)    Alcohol   16 No current use.    Per 2/8/22 CA:  \"5 times in the last 10 years.\" 1 beer 3-4 years ago No Oral    Marijuana/Hashish   17 No current use.    Per 2/8/22 CA:  \"Age 16-22, daily.\" " "5 years ago No Smoke    Cocaine/Crack 16 No current use.    Per 2/8/22 CA:         Meth/Amphetamines   18 No current use.    Per 2/8/22 CA:  Meth- \"past 3 months,  daily, \"I don't know how  much, maybe 2 grams on a  bad day and have been  using daily, sometimes  only a half gram.     Age 20-27, daily use of a  gram a day.\" January 2022 No Smoke/Snort    Heroin   19 No current use.    Per 2/8/22 CA:  Heroin- \"20-30 times in  my life, a gram or two  over a day.\"   Smoke/ IV use 1 time   X Other Opiates/Synthetics   17 Current use: Patient stated he relapsed 2 months ago but didn't tell anyone until 2 weeks ago.  Patient said he is smoking 8-9 fake Perc 30s per day.  Patient said he is losing all his money and he \"doesn't want to do this anymore.\"  Patient said he began missing work and he has not been showing up and was recently kicked out of a sober house called [facility].  Patient said he \"needs to go somewhere so I can't get anything.\"  Patient said he does not want to use MAT and he doesn't like suboxone and wants to quit \"cold turkey.\"  Patient denies any serious withdrawal symptoms other than muscle aches.    Per 2/8/22 CA:  Age 16-20, Vicodin or  Percocet, \"just for fun.\"  Fentanyl-\"started last year  and I thought it was Oxy.  For a year straight, 3-30  mg pills.\"    4-5 months ago. RX  Suboxone-\"hated it.\" 8/4/22 Yes Smoke    Inhalants  23 No current use.    Per 2/8/22 CA: Whippets \"just once 8 years ago.\" 8 years ago No Inhaled    Benzodiazepines   No use        Hallucinogens   25 No current use.    Per 2/8/22 CA: Acid and  mushrooms.-one time each. 6 years ago No Oral    Barbiturates/Sedatives/Hypnotics   No use        Over-the-Counter Drugs   No use        Other   17 No current use.    Per 2/8/22 CA: Ecstasy,  16-20, weekly (2 pills) and  then less after that. 5 years ago No Oral    Nicotine   16 Current use is the same.    Per 2/8/22 CA: \"a pack a day  and also E-cigs.\" 8/4/2022 Yes Smoke/Inhale " "    Withdrawal symptoms: Have you had any of the following withdrawal symptoms?    Headache  Fatigue / Extremely Tired  Sad / Depressed Feeling  Muscle Aches  Anxiety / Worried    Have you experienced any cravings?  Yes    Have you had periods of abstinence?  Yes   What was your longest period? 2 years    Any circumstances that lead to relapse? Relationship problems.    What activities have you engaged in when using alcohol/other drugs that could be hazardous to you or others?  The patient reported having a history of using on the streets.  Patient uses alone.    A description of any risk-taking behavior, including behavior that puts the client at risk of exposure to blood-borne or sexually transmitted diseases: Patient denied.    Arrests and legal interventions related to substance use: Patient is on probation in [facility].    A description of how the patient's use affected their ability to function appropriately in a work setting: Patient works at an [employer] shop full time, but part time now because he doesn't always show up.    A description of how the patient's use affected their ability to function appropriately in an educational setting: P_atsamaria is not enrolled in school.    Leisure time activities that are associated with substance use: Patient said he just sits down and hangs out.    Do you think your substance use has become a problem for you? He agrees he has a substance abuse problem.    MEDICAL HISTORY  Physical or medical concerns or diagnoses:  Patient said he has a \"leaky heart valve\" but that does not impact his ability to function.    Do you have any current medical treatment needs not being addressed by inpatient treatment?  Patient denies any current medical concerns.    Do you need a referral for a medical provider? Patient declined.    Current medications: Patient reports not taking any current medications      Are you pregnant? NA, Male    Do you have any specific physical " needs/accommodations? No    MENTAL HEALTH HISTORY:  Have you ever had  hospitalizations or treatment for mental health illness: No    Mental health history, including diagnosis and symptoms, and the effect on the client's ability to function: Patient endorse depression and ADHD and patient said it does not impact his ability to function.    Current mental health treatment including psychotropic medication needed to maintain stability: (Note: The assessment must utilize screening tools approved by the commissioner pursuant to section 245.4863 to identify whether the client screens positive for co-occurring disorders): None.    GAIN-SS Tool:  When was the last time that you had significant problems... 12/23/2021 2/8/2022 8/4/2022   with feeling very trapped, lonely, sad, blue, depressed or hopeless about the future? 2 to 12 months ago Past month Past month   with sleep trouble, such as bad dreams, sleeping restlessly, or falling asleep during the day? Past Month Past Month Past Month   with feeling very anxious, nervous, tense, scared, panicked or like something bad was going to happen? 2 to 12 months ago Past month Past month   with becoming very distressed & upset when something reminded you of the past? 2 to 12 months ago Past month Past month   with thinking about ending your life or committing suicide? 2 to 12 months ago 2 to 12 months ago 1+ years ago     When was the last time that you did the following things 2 or more times? 12/23/2021 2/8/2022 8/4/2022   Lied or conned to get things you wanted or to avoid having to do something? 2 to 12 months ago 2 to 12 months ago Past month   Had a hard time paying attention at school, work or home? 2 to 12 months ago 2 to 12 months ago Past month   Had a hard time listening to instructions at school, work or home? 2 to 12 months ago 2 to 12 months ago Past month   Were a bully or threatened other people? 2 to 12 months ago 2 to 12 months ago Past month   Started  "physical fights with other people? 1+ years ago 1+ years ago Never       Have you ever been verbally, emotionally, physically or sexually abused?   The patient denied having any history of being verbally, emotionally, physically or sexually abused.    Family history of substance use and misuse: Patient said his father, uncles, and \"a lot of people.\"    The patient's desire for family involvement in the treatment program: Patient declined.  Level of family support: Patient said his family is supportive.    Social network in relation to expected support for recovery: Patient has attended AA and CMA in the past.    Are you currently in a significant relationship? No.    Do you have any children (include living arrangements/custody/contact)?:  Patient has 1 child who lives with his mother.    What is your current living situation? Patient is currently homeless.    Are you employed/attending school? Patient is employed.    SUMMARY:  Ability to understand written treatment materials: Yes  Ability to understand patient rules and patient rights: Yes  Does the patient recognize needs related to substance use and is willing to follow treatment recommendations: Yes  Does the patient have an opioid use disorder:  has a history of opiate use and was give treatment options, including Medication Assisted Treatment, and information on the risks of opiod use disorder including recognizing and responding to opiod overdose.    ASAM Dimension Scale Ratings:  Dimension 1: 1 Client can tolerate and cope with withdrawal discomfort. The client displays mild to moderate intoxication or signs and symptoms interfering with daily functioning but does not immediately endanger self or others. Client poses minimal risk of severe withdrawal.  Dimension 2: 0 Client displays full functioning with good ability to cope with physical discomfort.  Dimension 3: 2 Client has difficulty with impulse control and lacks coping skills.  Client has difficulty " functioning in significant life areas. Client has moderate symptoms of emotional, behavioral, or cognitive problems. Client is able to participate in most treatment activities.  Dimension 4: 2 Client displays verbal compliance, but lacks consistent behaviors; has low motivation for change; and is passively involved in treatment.  Dimension 5: 4 No awareness of the negative impact of mental health problems or substance abuse. No coping skills to arrest mental health or addiction illnesses, or prevent relapse.  Dimension 6: 4 Client has (A) Chronically antagonistic significant other, living environment, family, peer group or long-term criminal justice involvement that is harmful to recovery or treatment progress, or (B) Client has an actively antagonistic significant other, family, work, or living environment with immediate threat to the client's safety and well-being.    Category of Substance Severity (ICD-10 Code / DSM 5 Code)     Alcohol Use Disorder The patient does not meet the criteria for an Alcohol use disorder.   Cannabis Use Disorder The patient does not meet the criteria for a Cannabis use disorder.   Hallucinogen Use Disorder The patient does not meet the criteria for a Hallucinogen use disorder.   Inhalant Use Disorder The patient does not meet the criteria for an Inhalant use disorder.   Opioid Use Disorder Severe   (F11.20) (304.00)   Sedative, Hypnotic, or Anxiolytic Use Disorder The patient does not meet the criteria for a Sedative/Hypnotic use disorder.   Stimulant Related Disorder The patient does not currently meet the criteria for a Stimulant use disorder, but has a history of with a diagnosis of Severe Amphetamine Type 6 months ago.   Tobacco Use Disorder The patient does not currently meet the criteria for a Tobacco use disorder, but has a history of daily tobacco use.   Other (or unknown) Substance Use Disorder The patient does not meet the criteria for a Other (or unknown) Substance use  disorder.     A problematic pattern of alcohol/drug use leading to clinically significant impairment or distress, as manifested by at least two of the following, occurring within a 12-month period:    1.) Alcohol/drug is often taken in larger amounts or over a longer period than was intended.  2.) There is a persistent desire or unsuccessful efforts to cut down or control alcohol/drug use  3.) A great deal of time is spent in activities necessary to obtain alcohol, use alcohol, or recover from its effects.  4.) Craving, or a strong desire or urge to use alcohol/drug  5.) Recurrent alcohol/drug use resulting in a failure to fulfill major role obligations at work, school or home.  6.) Continued alcohol use despite having persistent or recurrent social or interpersonal problems caused or exacerbated by the effects of alcohol/drug.  10.) Tolerance, as defined by either of the following: A need for markedly increased amounts of alcohol/drug to achieve intoxication or desired effect.  11.) Withdrawal, as manifested by either of the following: Alcohol/drug (or a closely related substance, such as a benzodiazepine) is taken to relieve or avoid withdrawal symptoms.    Specify if: In early remission:  After full criteria for alcohol/drug use disorder were previously met, none of the criteria for alcohol/drug use disorder have been met for at least 3 months but for less than 12 months (with the exception that Criterion A4,  Craving or a strong desire or urge to use alcohol/drug  may be met).     In sustained remission:   After full criteria for alcohol use disorder were previously met, non of the criteria for alcohol/drug use disorder have been met at any time during a period of 12 months or longer (with the exception that Criterion A4,  Craving or strong desire or urge to use alcohol/drug  may be met).     Specify if:   This additional specifier is used if the individual is in an environment where access to alcohol is  restricted.    Mild: Presence of 2-3 symptoms  Moderate: Presence of 4-5 symptoms  Severe: Presence of 6 or more symptoms    Collateral information: JADA Collateral Info: Sufficient information is obtained from the patient to support diagnosis and recommendations. Contact with a collateral sources is not required.   Giovany Garrett Release of Information requests were e-mailed to the [place] [address] OB's at [email or web address] on 8/4/2022 with requests for the following releases:    Patient's e-mail address: [email or web address]    1.) JADA - [id number] - Exchange of MH & JADA Records  [facility]  [address] [place] [address]  Phone: [phone]  Fax: [phone]    2.) JADA - [id number] -  [facility]ERG[facility]  Tel #: [phone]  Fax #: [phone]    Recommendations:   1)  Complete a residential based or similar treatment program.  2)  Abstain from all mood-altering chemicals unless prescribed by a licensed provider.   3)  Attend, at minimum, 2 weekly support group meetings, such as 12 step based (AA/NA), SMART Recovery, Health Realizations, and/or Refuge Recovery meetings.     4)  Actively work with a male mentor/sponsor on a weekly basis.   5)  Follow all the recommendations of your treatment/medical providers.  6)  Remain law abiding and follow all recommendations of the Courts/PO.  7)  Patient may benefit from obtaining a full mental health evaluation.    Clinical Substantiation:  Met with patient individually on 8/4/22 for a comprehensive assessment including summary of assessment and conclusion, assessment risk and appropriate steps taken, documentation to support the diagnosis, rationale for disposition and appropriate level of care recommended and alternative for treatment options.  Patient said that he has recently been kicked out of a sober house due to a relapse on opiates and he wants to go back to residential treatment.   "Patient expresses desire to quit using substances for good.  Patient said he is also on probation in [facility] and provided information for his PO (see above).  Patient said he had the most success at \"[facility] ([facility]) in [place] and that he wants to return due to availability of longer term programming.  Patient said he wants to go to sober housing afterwards and they will let him \"live there as long as I need to.\"  Patient has a long history of substance use and his longest period of sobriety has been 2 years.  Patient said that he often relapses during stressful life events.    Rational for recommended level of care: The patient is homeless and lacks a sober living environment, lacks long-term sober maintenance skills, lacks sober coping skills, lacks awareness regarding the disease model of addiction and lacks a sober peer support network.    The patient reported he is willing to follow the above recommendations.    Referrals/ Alternatives:  [facility]  [address] [place] [address]  Phone: [phone]  Fax: [phone]    Alternative:  [facility]  [address] [place] [address]  Phone: [phone]  Fax: [phone]    DAANES Assessment ID: [id number]    JADA consult completed by: JOSTIN Guerin.  Phone Number: [phone]  E-mail Address: [email or web address] Evaluation Department  [address] [place] [address]     *Due to regulation of Title 42 of the Code of Federal Regulations (CFR) Part 2: Confidentiality laws apply to this note and the information wherein.  Thus, this note cannot be copy and pasted into any other health care staff's note nor can it be included in general medical records sent to ANY outside agency without the patient's written consent.        "

## 2022-08-16 ENCOUNTER — APPOINTMENT (OUTPATIENT)
Dept: GENERAL RADIOLOGY | Facility: CLINIC | Age: 32
DRG: 897 | End: 2022-08-16
Attending: EMERGENCY MEDICINE
Payer: COMMERCIAL

## 2022-08-16 ENCOUNTER — TELEPHONE (OUTPATIENT)
Dept: BEHAVIORAL HEALTH | Facility: CLINIC | Age: 32
End: 2022-08-16

## 2022-08-16 ENCOUNTER — HOSPITAL ENCOUNTER (INPATIENT)
Facility: CLINIC | Age: 32
LOS: 3 days | Discharge: PSYCHIATRIC OR SUBSTANCE USE FACILITY | DRG: 897 | End: 2022-08-19
Attending: EMERGENCY MEDICINE | Admitting: PSYCHIATRY & NEUROLOGY
Payer: COMMERCIAL

## 2022-08-16 DIAGNOSIS — Q23.81 BICUSPID AORTIC VALVE: ICD-10-CM

## 2022-08-16 DIAGNOSIS — F13.20 BENZODIAZEPINE DEPENDENCE, CONTINUOUS (H): ICD-10-CM

## 2022-08-16 DIAGNOSIS — Z20.822 CONTACT WITH AND (SUSPECTED) EXPOSURE TO COVID-19: ICD-10-CM

## 2022-08-16 DIAGNOSIS — F19.10 POLYSUBSTANCE ABUSE (H): ICD-10-CM

## 2022-08-16 DIAGNOSIS — F19.20 CHEMICAL DEPENDENCY (H): Primary | ICD-10-CM

## 2022-08-16 LAB
ALBUMIN SERPL-MCNC: 4 G/DL (ref 3.4–5)
ALP SERPL-CCNC: 111 U/L (ref 40–150)
ALT SERPL W P-5'-P-CCNC: 26 U/L (ref 0–70)
AMPHETAMINES UR QL SCN: ABNORMAL
ANION GAP SERPL CALCULATED.3IONS-SCNC: 4 MMOL/L (ref 3–14)
AST SERPL W P-5'-P-CCNC: 16 U/L (ref 0–45)
BARBITURATES UR QL: ABNORMAL
BASOPHILS # BLD AUTO: 0.1 10E3/UL (ref 0–0.2)
BASOPHILS NFR BLD AUTO: 1 %
BENZODIAZ UR QL: ABNORMAL
BILIRUB SERPL-MCNC: 0.3 MG/DL (ref 0.2–1.3)
BUN SERPL-MCNC: 13 MG/DL (ref 7–30)
CALCIUM SERPL-MCNC: 9.3 MG/DL (ref 8.5–10.1)
CANNABINOIDS UR QL SCN: ABNORMAL
CHLORIDE BLD-SCNC: 105 MMOL/L (ref 94–109)
CO2 SERPL-SCNC: 30 MMOL/L (ref 20–32)
COCAINE UR QL: ABNORMAL
CREAT SERPL-MCNC: 0.86 MG/DL (ref 0.66–1.25)
EOSINOPHIL # BLD AUTO: 0.2 10E3/UL (ref 0–0.7)
EOSINOPHIL NFR BLD AUTO: 3 %
ERYTHROCYTE [DISTWIDTH] IN BLOOD BY AUTOMATED COUNT: 11.7 % (ref 10–15)
GFR SERPL CREATININE-BSD FRML MDRD: >90 ML/MIN/1.73M2
GLUCOSE BLD-MCNC: 101 MG/DL (ref 70–99)
HCT VFR BLD AUTO: 40.3 % (ref 40–53)
HGB BLD-MCNC: 14 G/DL (ref 13.3–17.7)
IMM GRANULOCYTES # BLD: 0 10E3/UL
IMM GRANULOCYTES NFR BLD: 0 %
LYMPHOCYTES # BLD AUTO: 2 10E3/UL (ref 0.8–5.3)
LYMPHOCYTES NFR BLD AUTO: 28 %
MCH RBC QN AUTO: 29.5 PG (ref 26.5–33)
MCHC RBC AUTO-ENTMCNC: 34.7 G/DL (ref 31.5–36.5)
MCV RBC AUTO: 85 FL (ref 78–100)
MONOCYTES # BLD AUTO: 0.5 10E3/UL (ref 0–1.3)
MONOCYTES NFR BLD AUTO: 7 %
NEUTROPHILS # BLD AUTO: 4.2 10E3/UL (ref 1.6–8.3)
NEUTROPHILS NFR BLD AUTO: 61 %
NRBC # BLD AUTO: 0 10E3/UL
NRBC BLD AUTO-RTO: 0 /100
OPIATES UR QL SCN: ABNORMAL
PLATELET # BLD AUTO: 284 10E3/UL (ref 150–450)
POTASSIUM BLD-SCNC: 4.3 MMOL/L (ref 3.4–5.3)
PROT SERPL-MCNC: 7.3 G/DL (ref 6.8–8.8)
RBC # BLD AUTO: 4.74 10E6/UL (ref 4.4–5.9)
SARS-COV-2 RNA RESP QL NAA+PROBE: NEGATIVE
SODIUM SERPL-SCNC: 139 MMOL/L (ref 133–144)
WBC # BLD AUTO: 7 10E3/UL (ref 4–11)

## 2022-08-16 PROCEDURE — 36415 COLL VENOUS BLD VENIPUNCTURE: CPT | Performed by: EMERGENCY MEDICINE

## 2022-08-16 PROCEDURE — C9803 HOPD COVID-19 SPEC COLLECT: HCPCS | Performed by: EMERGENCY MEDICINE

## 2022-08-16 PROCEDURE — 250N000013 HC RX MED GY IP 250 OP 250 PS 637: Performed by: EMERGENCY MEDICINE

## 2022-08-16 PROCEDURE — 99285 EMERGENCY DEPT VISIT HI MDM: CPT | Mod: 25 | Performed by: EMERGENCY MEDICINE

## 2022-08-16 PROCEDURE — 128N000004 HC R&B CD ADULT

## 2022-08-16 PROCEDURE — 85025 COMPLETE CBC W/AUTO DIFF WBC: CPT | Performed by: EMERGENCY MEDICINE

## 2022-08-16 PROCEDURE — HZ2ZZZZ DETOXIFICATION SERVICES FOR SUBSTANCE ABUSE TREATMENT: ICD-10-PCS | Performed by: PSYCHIATRY & NEUROLOGY

## 2022-08-16 PROCEDURE — 87635 SARS-COV-2 COVID-19 AMP PRB: CPT | Performed by: EMERGENCY MEDICINE

## 2022-08-16 PROCEDURE — 82310 ASSAY OF CALCIUM: CPT | Performed by: EMERGENCY MEDICINE

## 2022-08-16 PROCEDURE — 250N000013 HC RX MED GY IP 250 OP 250 PS 637: Performed by: PSYCHIATRY & NEUROLOGY

## 2022-08-16 PROCEDURE — 71045 X-RAY EXAM CHEST 1 VIEW: CPT

## 2022-08-16 PROCEDURE — 99285 EMERGENCY DEPT VISIT HI MDM: CPT | Performed by: EMERGENCY MEDICINE

## 2022-08-16 PROCEDURE — 80307 DRUG TEST PRSMV CHEM ANLYZR: CPT | Performed by: EMERGENCY MEDICINE

## 2022-08-16 RX ORDER — AMOXICILLIN 250 MG
1 CAPSULE ORAL 2 TIMES DAILY PRN
Status: DISCONTINUED | OUTPATIENT
Start: 2022-08-16 | End: 2022-08-19 | Stop reason: HOSPADM

## 2022-08-16 RX ORDER — CLONIDINE HYDROCHLORIDE 0.1 MG/1
0.1 TABLET ORAL EVERY 6 HOURS PRN
Status: DISCONTINUED | OUTPATIENT
Start: 2022-08-16 | End: 2022-08-19 | Stop reason: HOSPADM

## 2022-08-16 RX ORDER — IBUPROFEN 600 MG/1
600 TABLET, FILM COATED ORAL EVERY 6 HOURS PRN
Status: DISCONTINUED | OUTPATIENT
Start: 2022-08-16 | End: 2022-08-19 | Stop reason: HOSPADM

## 2022-08-16 RX ORDER — HYDROXYZINE HYDROCHLORIDE 25 MG/1
25 TABLET, FILM COATED ORAL EVERY 4 HOURS PRN
Status: DISCONTINUED | OUTPATIENT
Start: 2022-08-16 | End: 2022-08-19 | Stop reason: HOSPADM

## 2022-08-16 RX ORDER — BUPRENORPHINE 2 MG/1
2 TABLET SUBLINGUAL 4 TIMES DAILY
Status: DISCONTINUED | OUTPATIENT
Start: 2022-08-17 | End: 2022-08-17

## 2022-08-16 RX ORDER — MAGNESIUM HYDROXIDE/ALUMINUM HYDROXICE/SIMETHICONE 120; 1200; 1200 MG/30ML; MG/30ML; MG/30ML
30 SUSPENSION ORAL EVERY 4 HOURS PRN
Status: DISCONTINUED | OUTPATIENT
Start: 2022-08-16 | End: 2022-08-19 | Stop reason: HOSPADM

## 2022-08-16 RX ORDER — PHENOBARBITAL 64.8 MG/1
64.8 TABLET ORAL 3 TIMES DAILY
Status: DISCONTINUED | OUTPATIENT
Start: 2022-08-16 | End: 2022-08-17

## 2022-08-16 RX ORDER — ONDANSETRON 4 MG/1
4 TABLET, ORALLY DISINTEGRATING ORAL EVERY 6 HOURS PRN
Status: DISCONTINUED | OUTPATIENT
Start: 2022-08-16 | End: 2022-08-19 | Stop reason: HOSPADM

## 2022-08-16 RX ORDER — LOPERAMIDE HCL 2 MG
2 CAPSULE ORAL EVERY 4 HOURS PRN
Status: DISCONTINUED | OUTPATIENT
Start: 2022-08-16 | End: 2022-08-19 | Stop reason: HOSPADM

## 2022-08-16 RX ORDER — TRAZODONE HYDROCHLORIDE 50 MG/1
50 TABLET, FILM COATED ORAL
Status: DISCONTINUED | OUTPATIENT
Start: 2022-08-16 | End: 2022-08-19 | Stop reason: HOSPADM

## 2022-08-16 RX ORDER — BUPRENORPHINE 2 MG/1
2 TABLET SUBLINGUAL
Status: DISCONTINUED | OUTPATIENT
Start: 2022-08-16 | End: 2022-08-17

## 2022-08-16 RX ORDER — DICYCLOMINE HYDROCHLORIDE 10 MG/1
20 CAPSULE ORAL 3 TIMES DAILY PRN
Status: DISCONTINUED | OUTPATIENT
Start: 2022-08-16 | End: 2022-08-19 | Stop reason: HOSPADM

## 2022-08-16 RX ADMIN — PHENOBARBITAL 64.8 MG: 64.8 TABLET ORAL at 22:22

## 2022-08-16 RX ADMIN — NICOTINE POLACRILEX 4 MG: 2 GUM, CHEWING BUCCAL at 16:25

## 2022-08-16 ASSESSMENT — ACTIVITIES OF DAILY LIVING (ADL)
WALKING_OR_CLIMBING_STAIRS_DIFFICULTY: NO
ADLS_ACUITY_SCORE: 35
CONCENTRATING,_REMEMBERING_OR_MAKING_DECISIONS_DIFFICULTY: NO
ORAL_HYGIENE: INDEPENDENT
ADLS_ACUITY_SCORE: 33
DOING_ERRANDS_INDEPENDENTLY_DIFFICULTY: NO
FALL_HISTORY_WITHIN_LAST_SIX_MONTHS: YES
ADLS_ACUITY_SCORE: 28
TOILETING_ISSUES: NO
DRESSING/BATHING_DIFFICULTY: NO
ADLS_ACUITY_SCORE: 45
HYGIENE/GROOMING: INDEPENDENT
CHANGE_IN_FUNCTIONAL_STATUS_SINCE_ONSET_OF_CURRENT_ILLNESS/INJURY: NO
NUMBER_OF_TIMES_PATIENT_HAS_FALLEN_WITHIN_LAST_SIX_MONTHS: 1
ADLS_ACUITY_SCORE: 35
DIFFICULTY_EATING/SWALLOWING: NO
DRESS: INDEPENDENT
WEAR_GLASSES_OR_BLIND: NO
LAUNDRY: WITH SUPERVISION

## 2022-08-16 ASSESSMENT — ENCOUNTER SYMPTOMS
HALLUCINATIONS: 0
VOMITING: 0
FEVER: 0
SHORTNESS OF BREATH: 1
COUGH: 0
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
## 2022-08-16 NOTE — ED TRIAGE NOTES
Triage Assessment     Row Name 08/16/22 6108       Triage Assessment (Adult)    Airway WDL WDL       Respiratory WDL    Respiratory WDL WDL       Cardiac WDL    Cardiac WDL WDL

## 2022-08-16 NOTE — TELEPHONE ENCOUNTER
S ER provider called to give clinical on 30/M in [place] ER    B Pt reports using methamphetamine, fentanyl, and benzos daily for past 6 weeks. Pt reports taking 3-5 pills of either Xanax or Klonopin that someone gives to him, not from the street. Pt denies HX of withdrawal seizures and DT's, reports he gets shaky and sweaty during withdrawal. Pt denies alcohol use and reports not wanting to get on Suboxone. Pt denies SI, HI or other mental health concerns. Pt presents as 'sleepy' but ambulatory, eating, drinking.     A Vol    R In [place] ER awaiting detox placement.   Patient cleared and ready for behavioral bed placement: Yes    6:15pm: Paged on call provider Riley to present for 3a/CD/Veluvali  6:45pm: On call provider Riley accepts pt for 3a/CD/Veluvali  7:10pm: Intake informed unit charge of pt placement and report time; charge nurse reports ER can call for report whenever ready. Intake sent text page to ER charge with pt placement and report time.

## 2022-08-17 LAB
CHOLEST SERPL-MCNC: 121 MG/DL
HBA1C MFR BLD: 5.2 % (ref 0–5.6)
HDLC SERPL-MCNC: 42 MG/DL
LDLC SERPL CALC-MCNC: 69 MG/DL
NONHDLC SERPL-MCNC: 79 MG/DL
TRIGL SERPL-MCNC: 51 MG/DL
TSH SERPL DL<=0.005 MIU/L-ACNC: 0.75 MU/L (ref 0.4–4)

## 2022-08-17 PROCEDURE — 83036 HEMOGLOBIN GLYCOSYLATED A1C: CPT | Performed by: PSYCHIATRY & NEUROLOGY

## 2022-08-17 PROCEDURE — 99223 1ST HOSP IP/OBS HIGH 75: CPT | Mod: AI | Performed by: PSYCHIATRY & NEUROLOGY

## 2022-08-17 PROCEDURE — 36415 COLL VENOUS BLD VENIPUNCTURE: CPT | Performed by: PSYCHIATRY & NEUROLOGY

## 2022-08-17 PROCEDURE — 128N000004 HC R&B CD ADULT

## 2022-08-17 PROCEDURE — 84443 ASSAY THYROID STIM HORMONE: CPT | Performed by: PSYCHIATRY & NEUROLOGY

## 2022-08-17 PROCEDURE — 250N000013 HC RX MED GY IP 250 OP 250 PS 637: Performed by: PSYCHIATRY & NEUROLOGY

## 2022-08-17 PROCEDURE — 99232 SBSQ HOSP IP/OBS MODERATE 35: CPT | Performed by: STUDENT IN AN ORGANIZED HEALTH CARE EDUCATION/TRAINING PROGRAM

## 2022-08-17 PROCEDURE — 80061 LIPID PANEL: CPT | Performed by: PSYCHIATRY & NEUROLOGY

## 2022-08-17 RX ORDER — OLANZAPINE 5 MG/1
5 TABLET ORAL 4 TIMES DAILY PRN
Status: DISCONTINUED | OUTPATIENT
Start: 2022-08-17 | End: 2022-08-19 | Stop reason: HOSPADM

## 2022-08-17 RX ADMIN — PHENOBARBITAL 64.8 MG: 64.8 TABLET ORAL at 09:09

## 2022-08-17 ASSESSMENT — ACTIVITIES OF DAILY LIVING (ADL)
LAUNDRY: WITH SUPERVISION
ADLS_ACUITY_SCORE: 28
HYGIENE/GROOMING: INDEPENDENT
ORAL_HYGIENE: INDEPENDENT
ADLS_ACUITY_SCORE: 28
DRESS: INDEPENDENT
ADLS_ACUITY_SCORE: 28

## 2022-08-17 NOTE — PLAN OF CARE
Problem: Substance Withdrawal  Goal: Substance Withdrawal  Description: Signs and symptoms of listed problems will be absent or manageable.  Outcome: Ongoing, Progressing   Goal Outcome Evaluation:     Patient continues on Benzos withdrawal. Patient slept through the night with no problem notice. Respiration regular, even and unlabored. Will continue to monitor patient per his plan of care.
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
## 2022-08-17 NOTE — PLAN OF CARE
Problem: Substance Withdrawal  Goal: Substance Withdrawal  Description: Signs and symptoms of listed problems will be absent or manageable.  Outcome: Ongoing, Progressing   Goal Outcome Evaluation:    Plan of Care Reviewed With: patient   Pt was isolative to his room, not wanting to come out for anything. He stated that he want to sleep, complaining that he is being woken up frequently. Pt was medication compliant. He denied any pain or discomfort, denied hallucinations. His MSSA was 5 and COWS was 4. Pt was easily irritable, easily falls back to sleep on and off during assessment. He stated that he wants to see and be his family, was teary. He did not come out to the milieu, his vital signs were stable.utox is negative for benzos. Continue to monitor.

## 2022-08-17 NOTE — PLAN OF CARE
Behavioral Team Discussion: (8/17/2022)    Continued Stay Criteria/Rationale: Patient admitted for Chemical Use Issues.  Plan: The following services will be provided to the patient; psychiatric assessment, medication management, therapeutic milieu, individual and group support, and skills groups.   Participants: 3A Provider: Dr. Patricia Baumann MD; 3A RN: Mindi Cooley, RN; 3A CM's: Cony Loera.  Summary/Recommendation: Providers will assess today for treatment recommendations, discharge planning, and aftercare plans. CM will meet with pt for discharge planning.   Medical/Physical: No biomedical concerns noted upon admission  Precautions:   Behavioral Orders   Procedures     Code 1 - Restrict to Unit     Routine Programming     As clinically indicated     Status 15     Every 15 minutes.     Withdrawal precautions     Rationale for change in precautions or plan: N/A  Progress: Initial     ASAM Dimension Scale Ratings:  Dimension 1: 3 Client tolerates and yordan with withdrawal discomfort poorly. Client has severe intoxication, such that the client endangers self or others, or intoxication has not abated with less intensive levels of services. Client displays severe signs and symptoms; or risk of severe, but manageable withdrawal; or withdrawal worsening despite detox at less intensive level.  Dimension 2: 3 Client tolerates and yordan poorly with physical problems or has poor general health. Client neglects medical problems without active assistance.  Dimension 3: 2 Client has difficulty with impulse control and lacks coping skills. Client has thoughts of suicide or harm to others without means; however, the thoughts may interfere with participation in some treatment activities. Client has difficulty functioning in significant life areas. Client has moderate symptoms of emotional, behavioral, or cognitive problems. Client is able to participate in most treatment activities.  Dimension 4: 2 Client displays  verbal compliance, but lacks consistent behaviors; has low motivation for change; and is passively involved in treatment.  Dimension 5: 4 No awareness of the negative impact of mental health problems or substance abuse. No coping skills to arrest mental health or addiction illnesses, or prevent relapse.  Dimension 6: 4 Client has (A) Chronically antagonistic significant other, living environment, family, peer group or long-term criminal justice involvement that is harmful to recovery or treatment progress, or (B) Client has an actively antagonistic significant other, family, work, or living environment with immediate threat to the client's safety and well-being.    PT SEEN   AGREE WITH ASSESSMENT AND PLAN

## 2022-08-17 NOTE — ED NOTES
ED to Behavioral Floor Handoff    SITUATION  Giovany Garrett is a 31 year old male who speaks English and lives in a home alone The patient arrived in the ED by private car from home with a complaint of Addiction Problem (Seeking detox for benzos (reports ingests or snorts 3-5 pills of 5-10mg xanax daily, last used a few hours prior to coming), meth (smokes and snorts daily, last used a few hours prior to coming in), and fentanyl (5-10 tabs of 30 mg daily, snorts or smokes, last used a few hours prior to coming in; presented to treatment and was told to come detox before going back to treatment)  .The patient's current symptoms started/worsened 1 month(s) ago and during this time the symptoms have increased.   In the ED, pt was diagnosed with   Final diagnoses:   Polysubstance abuse (H)   Benzodiazepine dependence, continuous (H)        Initial vitals were: BP: 129/84  Pulse: 78  Temp: 98.2  F (36.8  C)  Resp: 16  Weight: 78.5 kg (173 lb)  SpO2: 98 %   --------  Is the patient diabetic? No   If yes, last blood glucose? --     If yes, was this treated in the ED? --  --------  Is the patient inebriated (ETOH) No or Impaired on other substances? Yes  MSSA done? N/A  Last MSSA score: --    Were withdrawal symptoms treated? N/A  Does the patient have a seizure history? No. If yes, date of most recent seizure--  --------  Is the patient patient experiencing suicidal ideation? reports occasional suicidal thoughts representing feeling that life is not worth feeling    Homicidal ideation? denies current or recent homicidal ideation or behaviors.    Self-injurious behavior/urges? denies current or recent self injurious behavior or ideation.  ------  Was pt aggressive in the ED No  Was a code called No  Is the pt now cooperative? Yes  -------  Meds given in ED:   Medications   nicotine (NICORETTE) gum 4 mg (4 mg Buccal Given 8/16/22 7922)      Family present during ED course? No  Family currently present? No    BACKGROUND  Does  the patient have a cognitive impairment or developmental disability? No  Allergies: No Known Allergies.   Social demographics are   Social History     Socioeconomic History     Marital status: Single   Tobacco Use     Smoking status: Current Every Day Smoker     Packs/day: 1.00     Types: Cigarettes     Smokeless tobacco: Never Used   Substance and Sexual Activity     Alcohol use: No     Drug use: No     Sexual activity: Not Currently        ASSESSMENT  Labs results   Labs Ordered and Resulted from Time of ED Arrival to Time of ED Departure   COMPREHENSIVE METABOLIC PANEL - Abnormal       Result Value    Sodium 139      Potassium 4.3      Chloride 105      Carbon Dioxide (CO2) 30      Anion Gap 4      Urea Nitrogen 13      Creatinine 0.86      Calcium 9.3      Glucose 101 (*)     Alkaline Phosphatase 111      AST 16      ALT 26      Protein Total 7.3      Albumin 4.0      Bilirubin Total 0.3      GFR Estimate >90     DRUG ABUSE SCREEN 1 URINE (ED) - Abnormal    Amphetamines Urine Screen Positive (*)     Barbiturates Urine Screen Negative      Benzodiazepines Urine Screen Negative      Cannabinoids Urine Screen Negative      Cocaine Urine Screen Negative      Opiates Urine Screen Negative     COVID-19 VIRUS (CORONAVIRUS) BY PCR - Normal    SARS CoV2 PCR Negative     CBC WITH PLATELETS AND DIFFERENTIAL    WBC Count 7.0      RBC Count 4.74      Hemoglobin 14.0      Hematocrit 40.3      MCV 85      MCH 29.5      MCHC 34.7      RDW 11.7      Platelet Count 284      % Neutrophils 61      % Lymphocytes 28      % Monocytes 7      % Eosinophils 3      % Basophils 1      % Immature Granulocytes 0      NRBCs per 100 WBC 0      Absolute Neutrophils 4.2      Absolute Lymphocytes 2.0      Absolute Monocytes 0.5      Absolute Eosinophils 0.2      Absolute Basophils 0.1      Absolute Immature Granulocytes 0.0      Absolute NRBCs 0.0        Imaging Studies:   Recent Results (from the past 24 hour(s))   XR Chest Port 1 View     Narrative    CHEST ONE VIEW  8/16/2022 4:14 PM     HISTORY: Shortness of breath, bicuspid aortic valve.    COMPARISON: None.      Impression    IMPRESSION: No acute disease.    JUSTIN CHRISTIANSON MD         SYSTEM ID:  [id number]      Most recent vital signs /82   Pulse 80   Temp 98.2  F (36.8  C) (Oral)   Resp 16   Wt 78.5 kg (173 lb)   SpO2 99%   BMI 24.13 kg/m     Abnormal labs/tests/findings requiring intervention:---   Pain control: pt had none  Nausea control: pt had none    RECOMMENDATION  Are any infection precautions needed (MRSA, VRE, etc.)? No If yes, what infection? --  ---  Does the patient have mobility issues? independently. If yes, what device does the pt use? ---  ---  Is patient on 72 hour hold or commitment? No If on 72 hour hold, have hold and rights been given to patient? N/A  Are admitting orders written if after 10 p.m. ?N/A  Tasks needing to be completed:---     Deysi Loza, RN   [phone] [facility]

## 2022-08-17 NOTE — PROGRESS NOTES
Writer spoke with Betzaida Swain, Clinical Supervisor, who stated that patient will be accepted back into the program after medically stable and a bed will be saved for him.     Writer informed Betzaida that MD recommends 3-5 day stay but we will remain in touch with the program for updates.     Please reach out to Betzaida Swain at [facility], [phone] with any questions or updates.     Ashleigh Valdovinos MA  Triage and Transition Services  [phone]

## 2022-08-17 NOTE — PROGRESS NOTES
08/16/22 2058   Patient Belongings   Did you bring any home meds/supplements to the hospital?  No   Patient Belongings other (see comments)   Patient Belongings Put in Hospital Secure Location (Security or Locker, etc.) other (see comments)   Belongings Search Yes   Clothing Search Yes   Second Staff jennifer lopez   Storage bin: belt, shoes, hat, box of fishing tackle, bag of fishing tackle, Ziploc with vapes, lighter, pen;  Ziploc with  cords, pliers    Storage room: large suitcase with clothing and misc toiletries    Box in med room: wallet, phone    Security env # [id number]: 2 visa cards, mastercard    Contraband envelope: multi-knife    ADMISSION:  I am responsible for any personal items that are not sent to the safe or pharmacy. [facility] is not responsible for loss, theft or damage of any property in my possession.    Patient Signature _______________________________________ Date/Time _____________________    Staff Signature _________________________________________ Date/Time _____________________    2nd Staff person, if patient is unable/unwilling to sign    _____________________________________________________ Date/Time _____________________    DISCHARGE:    All personal items have been returned to me.    Patient Signature __________________________________________ Date/Time _____________________    Staff Signature _____________________________________________ Date/Time _____________________

## 2022-08-17 NOTE — PROGRESS NOTES
08/16/22 2058   Patient Belongings   Did you bring any home meds/supplements to the hospital?  No   Patient Belongings other (see comments)   Patient Belongings Put in Hospital Secure Location (Security or Locker, etc.) other (see comments)   Belongings Search Yes   Clothing Search Yes   Second Staff jennifer lopez

## 2022-08-17 NOTE — CONSULTS
[facility]  Internal Medicine Consult     Giovany Garrett MRN# [id number]   Age: 31 year old YOB: 1990     Date of Admission: 8/16/2022  Date of Consult: 8/17/2022    Primary Care Provider: No Ref-Primary, Physician    Requesting Service: Behavioral Health - Patricia Baumann MD  Reason for Consult: General Medical Evaluation      SUBJECTIVE   CC:   Seeking detox from polysubstance abuse.    Assessment and Plan/Recommendations:     Giovany Garrett is a 31 year old male with PMHx significant for bicuspid aortic valve and polysubstane abuse with meth, fentanyl and benzodiazapines.  He presented seeking detox prior to going to a treatment facility. Medicine was consulted for co management. The patient's only home medication is nicorette gum     # Polysubstance abuse - Benzodiazepines, methamphetamine, and opioids (fentanyl)  MSSA 3-5 this shift.  Denies hx of withdrawal seizures. Pt denies alcohol use. Utox positive for amphetamine on arrival. Fentanyl is known to not appear of drug screens for opioids.   - Continue MSSA   - Further management per Psychiatry     # SOB   # Hx of aortic bicuspid valve   Reported in the ED. CXR was unremarkable. He reports smoking his methamphetamine and I suspect this is playing a contributing role. He has stable vitals and normal WBC.  - Denies use of IV drugs at any time.   - If develops fevers and s/sx of SIRS would recommend obtaining CBC, CRP, ESR and echocardiogram given increased risk of endocarditis with his aortic bicuspid valve in setting of polysubstance abuse.  -  Contact medicine with any concern for SIRS.  - Recommend obtaining echocardiogram as an outpatient for follow up on bicuspid aortic valve given 2-3/6 systolic murmur on exam     # Tobacco abuse   - nicotine replacement per psychiatry     Currently, medically stable and internal medicine will sign off. Please contact if future questions or concerns arise. Thank you for the  "opportunity to be a part of this patient's care.      Burt Aquino PA-C  Internal Medicine [facility] Hospitalist  Page job code [id number] (3B), 3594 (3A), or [id number] ([facility] and 4A)  Text paging via Glide is appreciated  August 17, 2022         HPI:   Giovany Garrett is a 31 year old male with PMHx significant for bicuspid aortic valve and polysubstane abuse with meth, fentanyl and benzodiazapines. He presented to [facility] seeking detox from benzodiazepines prior to going to a treatment facility. Pt reports using meth, fentanyl, and \"benzos\" daily. He used all 3 prior to arrival in the ED. He reports using Xanax, Klonopin, and possibly 1 other type of benzo for a month and a half.  In the ED, Vitals relatively stable, afebrile. Utox positive for amphetamines. CBC, CMP, TSH, and lipid panel unremarkable. He was admitted to detox and medicine was consulted for co management.    On my discussion with pt he reports that he feels tremulous and nauseated. Pt states \"I just want to be out of here... I want to see my family\" repeatedly. He denies being on any home medications. Denies any IV drug use. Pt otherwise denies fevers, chest pain or palpitations.         Past Medical History:     Past Medical History:   Diagnosis Date     Leaky heart valve         Reviewed and updated in [facility].     Past Surgical History:      Past Surgical History:   Procedure Laterality Date     HERNIA REPAIR  2007         Social History:     Social History     Tobacco Use     Smoking status: Current Every Day Smoker     Packs/day: 1.00     Types: Cigarettes     Smokeless tobacco: Never Used   Substance Use Topics     Alcohol use: No     Drug use: No        Family History:     Family History   Problem Relation Age of Onset     Substance Abuse Father      Depression Father      Substance Abuse Maternal Grandmother      Substance Abuse Maternal Grandfather      Substance Abuse Paternal Grandmother      Substance Abuse Paternal Grandfather          " "Allergies:   No Known Allergies      Medications:   Reviewed. Please see MAR     Review of Systems:   10 point ROS of systems including Constitutional, Eyes, Respiratory, Cardiovascular, Gastroenterology, Genitourinary, Integumentary, Muscularskeletal, Psychiatric were all negative except for pertinent positives noted in my HPI.    OBJECTIVE   Physical Exam:   Vitals were reviewed  Blood pressure 127/83, pulse 77, temperature 97.5  F (36.4  C), temperature source Temporal, resp. rate 16, height 1.803 m (5' 11\"), weight 78 kg (172 lb), SpO2 100 %.  General: Alert and oriented x3. Somewhat tremulous    HEENT: Anicteric sclera, MMM  Cardiovascular: RRR, 2-3/6 blowing systolic murmur.   Lungs: CTAB without wheezing or crackles   GI: Abdomen soft, non-tender without rebound or guarding. + Bowel sounds.  Vascular: No peripheral edema, distal pulses palpable  Neurologic: No focal deficits, CN II-XII grossly intact  Skin: No jaundice, rashes, or lesions        Data:        Lab Results   Component Value Date     08/16/2022     09/25/2018    Lab Results   Component Value Date    CHLORIDE 105 08/16/2022    CHLORIDE 104 09/25/2018    Lab Results   Component Value Date    BUN 13 08/16/2022    BUN 17 09/25/2018      Lab Results   Component Value Date    POTASSIUM 4.3 08/16/2022    POTASSIUM 4.3 09/25/2018    Lab Results   Component Value Date    CO2 30 08/16/2022    CO2 26 09/25/2018    Lab Results   Component Value Date    CR 0.86 08/16/2022    CR 0.87 09/25/2018        Lab Results   Component Value Date    WBC 7.0 08/16/2022    HGB 14.0 08/16/2022    HCT 40.3 08/16/2022    MCV 85 08/16/2022     08/16/2022     Lab Results   Component Value Date    WBC 7.0 08/16/2022            "

## 2022-08-17 NOTE — DISCHARGE INSTRUCTIONS
Behavioral Discharge Planning and Instructions  THANK YOU FOR CHOOSING [address]  [phone]    Summary: You were admitted to [facility] on 8/16/22 for detoxification from benzodiazepines.  A medical exam was performed that included lab work. You have met with a  and opted to return to treatment at [facility].  Please take care and make your recovery a daily priority, Giovany!  It was a pleasure working with you and the entire treatment team here wishes you the very best in your recovery!     Recommendation:  Residential Treatment    Main Diagnoses:  Per Dr. Patricia Baumann MD;  304.10 (F13.20) Sedative, Hypnotic, Anxiolytic Use Disorder Severe    Major Treatments, Procedures and Findings:  You were treated for benzodiazepine detoxification using phenobarbital. You previously had a chemical dependency assessment. You had labs drawn and those results were reviewed with you. Please take a copy of your lab work with you to your next primary care provider appointment.    Symptoms to Report:  If you experience more anxiety, confusion, sleeplessness, deep sadness or thoughts of suicide, notify your treatment team or notify your primary care provider. IF ANY OF THE SYMPTOMS YOU ARE EXPERIENCING ARE A MEDICAL EMERGENCY CALL 911 IMMEDIATELY.     Lifestyle Adjustment: Adjust your lifestyle to get enough sleep, relaxation, exercise and good nutrition. Continue to develop healthy coping skills to decrease stress and promote a sober living environment. Do not use mood altering substances including alcohol, illegal drugs or addictive medications other than what is currently prescribed.     Disposition: Return to treatment at [facility]    Facts about COVID19 at www.cdc.gov/COVID19 and www.[place].gov/covid19    Keeping hands clean is one of the most important steps we can take to avoid getting sick and spreading germs to others.  Please wash your hands frequently and lather with soap  for at least 20 seconds!    Medical Follow-Up:  [facility]  [address] [phone]    Please make a follow-up appointment within one week of discharge.     Treatment Follow-Up:  [facility]  Admission: Thursday August 19th with pick-up by program at 9:00 am  [address] [place] [id number]  [phone]     Recovery apps for your phone to locate current in person and zoom recovery meetings  Pink Esmeralda - meeting bola  AA  - meeting bola  Meeting guide - meeting bola  Quick NA meeting - meeting bola  [facility]- has various apps    Resources:  *due to covid-19 most AA/NA meetings are being held online however some are in-person or a hybrid combination please check online to verify*  AA meetings search for them at: https://aa-intergroup.org (worldwide meeting listings)  AA meetings for [place] area can be found online at: https://a[place].org (click local online meetings listings)  NA meetings for [place] area can be found online at: https://www.nam[place].org  (click find a meeting)  AA and NA Sponsors are excellent resources for support and you can find one at any support group meeting.   Alcoholics Anonymous (https://aa.org/): for information 24 hours/day  AA Intergroup service office in [place] (http://www.aastpaul.org/) 902.126.4043  AA Intergroup service office in [facility]: [phone]. (http://www.a[place].org/)  Narcotics Anonymous (www.nam[place].org) (815) 523-5524  https://aafairviewriverside.org/meetings  SMART Recovery - self management for addiction recovery:  www.smartrecovery.org  Pathways ~ A Health Crisis Resource & Support Center:  898.927.2278.  https://prescribetoprevent.org/patient-education/videos/  http://www.harmreduction.org  [facility] 207-295-3399  Support Group:  AA/NA and Sponsor/support.  National [place] on Mental Illness (www.[place].ke.org): 302.224.5114 or 316-006-7646.  Alcoholics Anonymous  (www.alcoholics-anonymous.org): Check your phone book for your local chapter.  Suicide Awareness Voices of Education (SAVE) (www.save.org): 800-550-SVWG (9029)  National Suicide Prevention Line (www.mentalhealthmn.org): 160-000-ADLY (8734)  Mental Health Consumer/Survivor Network of [place] (www.mhcsn.net): 146.410.4650 or 228-358-0877  Mental Health Association of [place] (www.mentalhealth.org): 446.731.8271 or 138-988-9156   Substance Abuse and Mental Health Services (www.samhsa.gov)  [place] Opioid Prevention Coalition: www.opioidcoalition.org    [place] Recovery Connection (MRC)  [facility] connects people seeking recovery to resources that help foster and sustain long-term recovery.  Whether you are seeking resources for treatment, transportation, housing, job training, education, health care or other pathways to recovery, [facility] is a great place to start.  627.181.2064.  www.Help/Systems.[employer]ivers Pod casts for nutrition and wellness  Listen on Apple Podcasts  [facility] [facility]   Nutrition       Understand the connection between what you eat and how you feel. Hosted by licensed nutritionists and dietitians from [employer] we share practical, real-life solutions for healthier living through nutrition.     General Medication Instructions:   See your medication sheet(s) for instructions.   Take all medications as prescribed.  Make no changes unless your primary care provider suggests them.   Go to all your primary care provider visits.  Be sure to have all your required lab tests. This way, your medicines can be refilled on time.  Do not use any forms of alcohol.    Please Note:  If you have any questions at anytime after you are discharged please call [facility] detox unit [facility] at [phone].  [facility], Behavioral Intake [phone]  Medical Records call [phone]  Outpatient Behavioral Intake call [phone]  LP+ Wait List/Bed  Availability call [phone]    Please remember to take all of your behavioral discharge planning and lab paperwork to any follow up appointments, it contains your lab results, diagnosis, medication list and discharge recommendations.      THANK YOU FOR CHOOSING [employer]

## 2022-08-17 NOTE — PLAN OF CARE
"Goal Outcome Evaluation:    Plan of Care Reviewed With: patient     Overall Patient Progress: improving       Goal Outcome Evaluation:     Plan of Care Reviewed With: patient   Overall Patient Progress: improving     Patient is here for opioid/Benzos/Meth use.  He has been sleeping through most of the shift.     He ate 70% of his supper.  He is drinking plenty fluids.  He denied SI/SIB/HI.  He denied pain.     1600 vitals: /81 (BP Location: Left arm)   Pulse 79   Temp 97.6  F (36.4  C) (Temporal)   Resp 16   Ht 1.803 m (5' 11\")   Wt 78 kg (172 lb)   SpO2 98%   BMI 23.99 kg/m          2000 vitals: /80 (BP Location: Left arm)   Pulse 70   Temp 98.9  F (37.2  C) (Oral)   Resp 16   Ht 1.803 m (5' 11\")   Wt 78 kg (172 lb)   SpO2 99%   BMI 23.99 kg/m          MSSA: 6 & 5  COWS:4 & 3     PRNs administered this shift: none     Discharge plans: Pending.           "

## 2022-08-17 NOTE — PLAN OF CARE
"  Problem: Substance Withdrawal  Goal: Substance Withdrawal  Description: Signs and symptoms of listed problems will be absent or manageable.  8/16/2022 2153 by Mary Austin RN  Outcome: Ongoing, Progressing  8/16/2022 2151 by Mary Austin RN  Outcome: Ongoing, Progressing     S = Situation:   Giovany Garrett is a 31 years old male seeking Opioid/Benzos and Meth detox. Pt is here in [facility] Voluntarily to seek help with his Substance use.     Pt denies alcohol use.     B  = Background:   Substance use history: Pt reports substance use began as a teenager, believes his substance use became a problem when he was about 17 years. He reports he was last sober 6 months go when he was in treatment. Pt reports he takes 3-5 pills of  Xanax daily. He reports also takes Fentanyl and Meth. His last use was today. It is pts first time at detox. He is here because his treatment facility asked him to go to detox first before treatment. He is a pack per day smoker. Pt declines any Suboxone at this time, stating \" I do not want to become addicted to anything.\"      Mental health history: Pt denies SI/SIB. Endorses anxiety 5/10, Depression 7/10. Denies auditory/visual hallucinations.     Medical history: Pt reports past medical history significant for bicuspid aortic valve. Denies seizure hx/DTs    Legal history: Pt is voluntary to detox.     Treatment history: Pt reports 8 treatment episodes in total.       Recent life stressors:               A  =  Assessment:   During admission interview, pt affect was full range. States he is tired and just wants to sleep.   MSSA: 3  COWS: 3    /83   Pulse 77   Temp 97.5  F (36.4  C) (Temporal)   Resp 16   Ht 1.803 m (5' 11\")   Wt 78 kg (172 lb)   SpO2 100%   BMI 23.99 kg/m      R =   Request or Recommendation:   Substance withdrawal will be monitored and treated using COWS and MSSA with Phenobarbital.    Pt will meet with psychiatry, internal medicine, and case management " tomorrow.  At the time of admission, pt reports discharge plan is unknown.

## 2022-08-18 PROCEDURE — 99239 HOSP IP/OBS DSCHRG MGMT >30: CPT | Performed by: PSYCHIATRY & NEUROLOGY

## 2022-08-18 PROCEDURE — 128N000004 HC R&B CD ADULT

## 2022-08-18 RX ORDER — CLONIDINE HYDROCHLORIDE 0.1 MG/1
0.1 TABLET ORAL
Qty: 7 TABLET | Refills: 0 | Status: SHIPPED | OUTPATIENT
Start: 2022-08-18

## 2022-08-18 ASSESSMENT — ACTIVITIES OF DAILY LIVING (ADL)
ADLS_ACUITY_SCORE: 28

## 2022-08-18 NOTE — PROGRESS NOTES
"[facility], [place]   Psychiatric Progress Note        Interim history   This is a 31 year old male with Opiate use disorder severe  Opiate withdrawal severe  Methamphetamine use disorder severe  Methamphetamine withdrawal severe.Pt seen in rounds.   The patient's care was discussed with the treatment team during the daily team meeting and/or staff's chart notes were reviewed.  Staff report patient has been visible in the milieu,  no acute eventsovernight.     Patient's mood is is tired  He is in bed  He now acknowledges that he did not use any benzodiazepines  His energy is low his sleep is poor  His appetite is poor  Low motivation interest   Denied suicidal/homicidal ideation/plan intent  Denied.psychosis  No prior suicde attempts  No access to gun  Pt is in alcohol withdrawal still being monitered every 4 hrs for it,   Pt mssa score are monitered  Tolerating meds and has no side effects.              Medications:     Current Facility-Administered Medications   Medication     alum & mag hydroxide-simethicone (MAALOX) suspension 30 mL     cloNIDine (CATAPRES) tablet 0.1 mg     dicyclomine (BENTYL) capsule 20 mg     hydrOXYzine (ATARAX) tablet 25 mg     ibuprofen (ADVIL/MOTRIN) tablet 600 mg     loperamide (IMODIUM) capsule 2 mg     nicotine polacrilex (NICORETTE) gum 4 mg     OLANZapine (zyPREXA) tablet 5 mg     ondansetron (ZOFRAN ODT) ODT tab 4 mg     senna-docusate (SENOKOT-S/PERICOLACE) 8.6-50 MG per tablet 1 tablet     traZODone (DESYREL) tablet 50 mg     Facility-Administered Medications Ordered in Other Encounters   Medication     Self Administer Medications: Behavioral Services             Allergies:   No Known Allergies         Psychiatric Examination:   Blood pressure (!) 147/79, pulse 93, temperature 97.3  F (36.3  C), temperature source Temporal, resp. rate 16, height 1.803 m (5' 11\"), weight 78 kg (172 lb), SpO2 97 %.  Weight is 172 lbs 0 oz  Body mass index is 23.99 " kg/m .    Appearance:  awake, alert and adequately groomed  Attitude:  cooperative  Eye Contact:  good  Mood:  anxious  Affect:  appropriate and in normal range and mood congruent  Speech:  clear, coherent rate /rhythm are good  Psychomotor Behavior:  no evidence of tardive dyskinesia, dystonia, or tics and intact station, gait and muscle tone  Throught Process:  logical  Associations:  no loose associations  Thought Content:  no evidence of suicidal ideation or homicidal ideation, no evidence of psychotic thought, no auditory hallucinations present and no visual hallucinations present  Insight:  limited  Judgement:  limited  Oriented to:  time, person, and place  Attention Span and Concentration:  intact  Recent and Remote Memory:  intact  Language fund of knowledge are adequate         Labs:     No results found for: NTBNPI, NTBNP  Lab Results   Component Value Date    WBC 7.0 08/16/2022    HGB 14.0 08/16/2022    HCT 40.3 08/16/2022    MCV 85 08/16/2022     08/16/2022     Lab Results   Component Value Date    TSH 0.75 08/17/2022         DX Opiate use disorder severe  Opiate withdrawal severe  Methamphetamine use disorder severe  Methamphetamine withdrawal severe     PLAN  Patient will be only having symptomatic withdrawal management for both opiates and methamphetamine withdrawal    Patient now reports he did tell the provider that he is not using any benzodiazepines    Laboratory/Imaging: reviewed with patient   Consults: internal medicine consult reviewed  Patient will be treated in therapeutic milieu with appropriate individual and group therapies as described.  PDMP CHECKED     Supportive psychotheraoy provided, sarah talked about recovery enviroment, relapse prevention, triggers to use.  Discussed with patient many issues of addiction,triggers, relapse, and establishing a solid recovery program.  Asked pt to be med complinat   Medical diagnoses to be addressed this admission:    Plan:  Assessment and  Plan/Recommendations:      Giovany Garrett is a 31 year old male with PMHx significant for bicuspid aortic valve and polysubstane abuse with meth, fentanyl and benzodiazapines.  He presented seeking detox prior to going to a treatment facility. Medicine was consulted for co management. The patient's only home medication is nicorette gum      # Polysubstance abuse - Benzodiazepines, methamphetamine, and opioids (fentanyl)  MSSA 3-5 this shift.  Denies hx of withdrawal seizures. Pt denies alcohol use. Utox positive for amphetamine on arrival. Fentanyl is known to not appear of drug screens for opioids.   - Continue MSSA   - Further management per Psychiatry      # SOB   # Hx of aortic bicuspid valve   Reported in the ED. CXR was unremarkable. He reports smoking his methamphetamine and I suspect this is playing a contributing role. He has stable vitals and normal WBC.  - Denies use of IV drugs at any time.   - If develops fevers and s/sx of SIRS would recommend obtaining CBC, CRP, ESR and echocardiogram given increased risk of endocarditis with his aortic bicuspid valve in setting of polysubstance abuse.  -  Contact medicine with any concern for SIRS.  - Recommend obtaining echocardiogram as an outpatient for follow up on bicuspid aortic valve given 2-3/6 systolic murmur on exam      # Tobacco abuse   - nicotine replacement per psychiatry      Currently, medically stable and internal medicine will sign off. Please contact if future questions or concerns arise. Thank you for the opportunity to be a part of this patient's care.        Legal Status: voluntary    Safety Assessment:   Checks:  15 min  Precautions: withdrawal precautions  Pt has not required locked seclusion or restraints in the past 24 hours to maintain safety, please refer to RN documentation for further details.  Discussed with patient many issues of addiction,triggers, relapse, and establishing a solid recovery program.  Able to give informed consent:  YES    Discussed Risks/Benefits/Side Effects/Alternatives: YES    After discussion of the indications, risks, benefits, alternatives and consequences of no treatment, the patient elects to complete detox and do trt

## 2022-08-18 NOTE — PROGRESS NOTES
Pt slated for discharge on 8/19/22 with pick-up by [facility] at 9:00 am. Contact is Karlene: [phone]. AVS updated. Pt and team notified.

## 2022-08-18 NOTE — PLAN OF CARE
Problem: Substance Withdrawal  Goal: Substance Withdrawal  Description: Signs and symptoms of listed problems will be absent or manageable.  Outcome: Ongoing, Progressing   Goal Outcome Evaluation:      The patient continues on Benzos withdrawal. Patient slept through the night. The 15-minutes safety checks were carried out through this shift without incidence. Will continue to monitor the patient per plan of care.

## 2022-08-18 NOTE — PLAN OF CARE
Goal Outcome Evaluation:    Plan of Care Reviewed With: patient     Problem: Suicidal Behavior  Goal: Suicidal Behavior is Absent or Managed  Outcome: Ongoing, Progressing     Problem: Substance Withdrawal  Goal: Substance Withdrawal  Description: Signs and symptoms of listed problems will be absent or manageable.  Outcome: Ongoing, Progressing  With prompts, patient came out of his room to get his vitals signs checked amd assessment done. He is less irritable compared to yesterday. Pt denied any disturbed thinking, he was in and out of his room. Pt will be discharging tomorrow   Morning to [facility] with a P/U at 0900.

## 2022-08-18 NOTE — DISCHARGE SUMMARY
Giovany Garrett MRN# [id number]   Age: 31 year old YOB: 1990     Anticipated discharge summary   DISCHARGE  DX  Opiate use disorder severe  Methamphetamine use disorder severe           Event Leading to Hospitalization:     See Admission note by admitting provider for patient encounter. for additional details.          Hospital Course:   PATIENT was admitted to [facility] attending  under DR vu, please review the detailed admit note on 8/17/2022   The patient was placed under status 15 (15 minute checks) to ensure patient safety.   Patient did not want to take Suboxone methadone for detox from opiates  He had symptomatic detox from methamphetamine  He reported that he was taking Xanax but then reported he was not using any benzos regularly  His U tox was negative  He did not need any detox from benzodiazepines as he was not taking any benzodiazepines    PATIENTdid participate in groups and was not visible in the milieu.     The patient's symptoms of withdrawal improved.     Patients energy motivation , sleep appetite improved.  Pt completed detox . It was un eventful.      Discussed with patient medications for craving.  Spoke with patient about triggers coping skills relapse prevention.    CONSULTS DONE DURING PATIENTS HOSPITALIZATION.  Patient was seen by medicine on date 8/17/2020    This as per their medical consult    Assessment and Plan/Recommendations:      Giovany Garrett is a 31 year old male with PMHx significant for bicuspid aortic valve and polysubstane abuse with meth, fentanyl and benzodiazapines.  He presented seeking detox prior to going to a treatment facility. Medicine was consulted for co management. The patient's only home medication is nicorette gum      # Polysubstance abuse - Benzodiazepines, methamphetamine, and opioids (fentanyl)  MSSA 3-5 this shift.  Denies hx of withdrawal seizures. Pt denies alcohol use. Utox positive for amphetamine on arrival. Fentanyl is  known to not appear of drug screens for opioids.   - Continue MSSA   - Further management per Psychiatry      # SOB   # Hx of aortic bicuspid valve   Reported in the ED. CXR was unremarkable. He reports smoking his methamphetamine and I suspect this is playing a contributing role. He has stable vitals and normal WBC.  - Denies use of IV drugs at any time.   - If develops fevers and s/sx of SIRS would recommend obtaining CBC, CRP, ESR and echocardiogram given increased risk of endocarditis with his aortic bicuspid valve in setting of polysubstance abuse.  -  Contact medicine with any concern for SIRS.  - Recommend obtaining echocardiogram as an outpatient for follow up on bicuspid aortic valve given 2-3/6 systolic murmur on exam      # Tobacco abuse   - nicotine replacement per psychiatry      Currently, medically stable and internal medicine will sign off. Please contact if future questions or concerns arise. Thank you for the opportunity to be a part of this patient's care.                         Labs:reviewed with patient       Recent Results (from the past 48 hour(s))   Drug abuse screen 1 urine (ED)    Collection Time: 08/16/22  3:48 PM   Result Value Ref Range    Amphetamines Urine Screen Positive (A) Screen Negative    Barbiturates Urine Screen Negative Screen Negative    Benzodiazepines Urine Screen Negative Screen Negative    Cannabinoids Urine Screen Negative Screen Negative    Cocaine Urine Screen Negative Screen Negative    Opiates Urine Screen Negative Screen Negative   Comprehensive metabolic panel    Collection Time: 08/16/22  3:57 PM   Result Value Ref Range    Sodium 139 133 - 144 mmol/L    Potassium 4.3 3.4 - 5.3 mmol/L    Chloride 105 94 - 109 mmol/L    Carbon Dioxide (CO2) 30 20 - 32 mmol/L    Anion Gap 4 3 - 14 mmol/L    Urea Nitrogen 13 7 - 30 mg/dL    Creatinine 0.86 0.66 - 1.25 mg/dL    Calcium 9.3 8.5 - 10.1 mg/dL    Glucose 101 (H) 70 - 99 mg/dL    Alkaline Phosphatase 111 40 - 150 U/L     AST 16 0 - 45 U/L    ALT 26 0 - 70 U/L    Protein Total 7.3 6.8 - 8.8 g/dL    Albumin 4.0 3.4 - 5.0 g/dL    Bilirubin Total 0.3 0.2 - 1.3 mg/dL    GFR Estimate >90 >60 mL/min/1.73m2   CBC with platelets and differential    Collection Time: 08/16/22  3:57 PM   Result Value Ref Range    WBC Count 7.0 4.0 - 11.0 10e3/uL    RBC Count 4.74 4.40 - 5.90 10e6/uL    Hemoglobin 14.0 13.3 - 17.7 g/dL    Hematocrit 40.3 40.0 - 53.0 %    MCV 85 78 - 100 fL    MCH 29.5 26.5 - 33.0 pg    MCHC 34.7 31.5 - 36.5 g/dL    RDW 11.7 10.0 - 15.0 %    Platelet Count 284 150 - 450 10e3/uL    % Neutrophils 61 %    % Lymphocytes 28 %    % Monocytes 7 %    % Eosinophils 3 %    % Basophils 1 %    % Immature Granulocytes 0 %    NRBCs per 100 WBC 0 <1 /100    Absolute Neutrophils 4.2 1.6 - 8.3 10e3/uL    Absolute Lymphocytes 2.0 0.8 - 5.3 10e3/uL    Absolute Monocytes 0.5 0.0 - 1.3 10e3/uL    Absolute Eosinophils 0.2 0.0 - 0.7 10e3/uL    Absolute Basophils 0.1 0.0 - 0.2 10e3/uL    Absolute Immature Granulocytes 0.0 <=0.4 10e3/uL    Absolute NRBCs 0.0 10e3/uL   Asymptomatic COVID-19 Virus (Coronavirus) by PCR Nose    Collection Time: 08/16/22  3:58 PM    Specimen: Nose; Swab   Result Value Ref Range    SARS CoV2 PCR Negative Negative   Lipid panel    Collection Time: 08/17/22  7:55 AM   Result Value Ref Range    Cholesterol 121 <200 mg/dL    Triglycerides 51 <150 mg/dL    Direct Measure HDL 42 >=40 mg/dL    LDL Cholesterol Calculated 69 <=100 mg/dL    Non HDL Cholesterol 79 <130 mg/dL   TSH with free T4 reflex and/or T3 as indicated    Collection Time: 08/17/22  7:55 AM   Result Value Ref Range    TSH 0.75 0.40 - 4.00 mU/L   Hemoglobin A1c    Collection Time: 08/17/22  7:55 AM   Result Value Ref Range    Hemoglobin A1C 5.2 0.0 - 5.6 %         Recent Results (from the past 240 hour(s))   Drug abuse screen 1 urine (ED)    Collection Time: 08/16/22  3:48 PM   Result Value Ref Range    Amphetamines Urine Screen Positive (A) Screen Negative     Barbiturates Urine Screen Negative Screen Negative    Benzodiazepines Urine Screen Negative Screen Negative    Cannabinoids Urine Screen Negative Screen Negative    Cocaine Urine Screen Negative Screen Negative    Opiates Urine Screen Negative Screen Negative   Comprehensive metabolic panel    Collection Time: 08/16/22  3:57 PM   Result Value Ref Range    Sodium 139 133 - 144 mmol/L    Potassium 4.3 3.4 - 5.3 mmol/L    Chloride 105 94 - 109 mmol/L    Carbon Dioxide (CO2) 30 20 - 32 mmol/L    Anion Gap 4 3 - 14 mmol/L    Urea Nitrogen 13 7 - 30 mg/dL    Creatinine 0.86 0.66 - 1.25 mg/dL    Calcium 9.3 8.5 - 10.1 mg/dL    Glucose 101 (H) 70 - 99 mg/dL    Alkaline Phosphatase 111 40 - 150 U/L    AST 16 0 - 45 U/L    ALT 26 0 - 70 U/L    Protein Total 7.3 6.8 - 8.8 g/dL    Albumin 4.0 3.4 - 5.0 g/dL    Bilirubin Total 0.3 0.2 - 1.3 mg/dL    GFR Estimate >90 >60 mL/min/1.73m2   CBC with platelets and differential    Collection Time: 08/16/22  3:57 PM   Result Value Ref Range    WBC Count 7.0 4.0 - 11.0 10e3/uL    RBC Count 4.74 4.40 - 5.90 10e6/uL    Hemoglobin 14.0 13.3 - 17.7 g/dL    Hematocrit 40.3 40.0 - 53.0 %    MCV 85 78 - 100 fL    MCH 29.5 26.5 - 33.0 pg    MCHC 34.7 31.5 - 36.5 g/dL    RDW 11.7 10.0 - 15.0 %    Platelet Count 284 150 - 450 10e3/uL    % Neutrophils 61 %    % Lymphocytes 28 %    % Monocytes 7 %    % Eosinophils 3 %    % Basophils 1 %    % Immature Granulocytes 0 %    NRBCs per 100 WBC 0 <1 /100    Absolute Neutrophils 4.2 1.6 - 8.3 10e3/uL    Absolute Lymphocytes 2.0 0.8 - 5.3 10e3/uL    Absolute Monocytes 0.5 0.0 - 1.3 10e3/uL    Absolute Eosinophils 0.2 0.0 - 0.7 10e3/uL    Absolute Basophils 0.1 0.0 - 0.2 10e3/uL    Absolute Immature Granulocytes 0.0 <=0.4 10e3/uL    Absolute NRBCs 0.0 10e3/uL   Asymptomatic COVID-19 Virus (Coronavirus) by PCR Nose    Collection Time: 08/16/22  3:58 PM    Specimen: Nose; Swab   Result Value Ref Range    SARS CoV2 PCR Negative Negative   Lipid panel     Collection Time: 08/17/22  7:55 AM   Result Value Ref Range    Cholesterol 121 <200 mg/dL    Triglycerides 51 <150 mg/dL    Direct Measure HDL 42 >=40 mg/dL    LDL Cholesterol Calculated 69 <=100 mg/dL    Non HDL Cholesterol 79 <130 mg/dL   TSH with free T4 reflex and/or T3 as indicated    Collection Time: 08/17/22  7:55 AM   Result Value Ref Range    TSH 0.75 0.40 - 4.00 mU/L   Hemoglobin A1c    Collection Time: 08/17/22  7:55 AM   Result Value Ref Range    Hemoglobin A1C 5.2 0.0 - 5.6 %            Because this patient meets criteria for an Alcohol Use Disorder, I performed the following brief intervention on the date of this note:              1) Expressed concern that the patient is drinking at unhealthy levels known to increase their risk of alcohol related problems              2) Gave feedback linking alcohol use and health, including personalized feedback explaining how alcohol use can interact with their medical and/or psychiatric problems, and with prescribed medications.              3) Advised patient to abstain.    PT counseled on nicotine cessation and nicotine replacement provided    Discussed with patient many issues of addiction,triggers, relapse, and establishing a solid recovery program.    DISCHARGE MENTAL STATUS EXAMINATION:  The patient is alert, oriented x3.  Good fund of knowledge.  Good use of language.  Recent and remote memory, language, fund of knowledge are all adequate.  Euthymic mood congruent affect  Speech normal rate/rhythm linear tp no loose asso,The patient does not have any active suicidal or homicidal ideation.  Does not have any auditory or visual hallucination.  Fair insight/judgment At this time, the patient was stable to be discharged.        Pt was not determined to not be a danger to himself or others. At the current time of discharge, the patient does not meet criteria for involuntary hospitalization. On the day of discharge, the patient reports that they do not have  suicidal or homicidal ideation and would never hurt themselves or others. Steps taken to minimize risk include: assessing patient s behavior and thought process daily during hospital stay, discharging patient with adequate plan for follow up for mental and physical health and discussing safety plan of returning to the hospital should the patient ever have thoughts of harming themselves or others. Therefore, based on all available evidence including the factors cited above, the patient does not appear to be at imminent risk for self-harm, and is appropriate for outpatient level of care.     Educated about side effects/risk vs benefits /alternative including non treatment.Pt consented to be on medication.     .Total time spent on discharge summary more than 35 min  More than  20 min  planning, coordination of care, medication reconciliation and performance of physical exam on day of discharge.Care was coordinated with unit RN and unit therapist       Review of your medicines      START taking      Dose / Directions   cloNIDine 0.1 MG tablet  Commonly known as: CATAPRES  Used for: Chemical dependency (H)      Dose: 0.1 mg  Take 1 tablet (0.1 mg) by mouth nightly as needed (chills, sweating, opioid withdrawal symptoms.)  Quantity: 7 tablet  Refills: 0     naloxone 4 MG/0.1ML nasal spray  Commonly known as: NARCAN  Used for: Chemical dependency (H)      Dose: 4 mg  Spray 1 spray (4 mg) into one nostril alternating nostrils as needed every 2-3 minutes until assistance arrives  Quantity: 0.2 mL  Refills: 1        STOP taking    nicotine polacrilex 4 MG gum  Commonly known as: NICORETTE              Where to get your medicines      These medications were sent to [facility] [place], [place] - [address]  [address] [place] [address], [facility] [address]    Phone: [phone]     cloNIDine 0.1 MG tablet    naloxone 4 MG/0.1ML nasal spray         Disposition: Patient going back to [facility]  "treatment tomorrow     Facts about COVID19 at www.cdc.gov/COVID19 and www.[place].gov/covid19     Keeping hands clean is one of the most important steps we can take to avoid getting sick and spreading germs to others.  Please wash your hands frequently and lather with soap for at least 20 seconds!             \"Much or all of the text in this note was generated through the use of Dragon Dictate voice to text software. Errors in spelling or words which appear to be out of contact are unintentional, may be present due having escaped editing\"     "

## 2022-08-19 VITALS
BODY MASS INDEX: 24.08 KG/M2 | OXYGEN SATURATION: 97 % | HEART RATE: 109 BPM | HEIGHT: 71 IN | SYSTOLIC BLOOD PRESSURE: 131 MMHG | WEIGHT: 172 LBS | RESPIRATION RATE: 16 BRPM | DIASTOLIC BLOOD PRESSURE: 86 MMHG | TEMPERATURE: 97.4 F

## 2022-08-19 PROCEDURE — 99207 PR NO BILLABLE SERVICE THIS VISIT: CPT | Performed by: PSYCHIATRY & NEUROLOGY

## 2022-08-19 RX ORDER — METHOCARBAMOL 500 MG/1
500 TABLET, FILM COATED ORAL 2 TIMES DAILY PRN
Qty: 30 TABLET | Refills: 0 | Status: SHIPPED | OUTPATIENT
Start: 2022-08-19

## 2022-08-19 ASSESSMENT — ACTIVITIES OF DAILY LIVING (ADL)
ADLS_ACUITY_SCORE: 28
HYGIENE/GROOMING: INDEPENDENT
ORAL_HYGIENE: INDEPENDENT
ADLS_ACUITY_SCORE: 28
ADLS_ACUITY_SCORE: 28
DRESS: INDEPENDENT
ADLS_ACUITY_SCORE: 28
LAUNDRY: WITH SUPERVISION
ADLS_ACUITY_SCORE: 28

## 2022-08-19 NOTE — PLAN OF CARE
Problem: Substance Withdrawal  Goal: Substance Withdrawal  Description: Signs and symptoms of listed problems will be absent or manageable.  Outcome: Ongoing, Progressing   Goal Outcome Evaluation:    Patient   continues on Benzo withdrawal protocol. Patient slept through the night with no problem reported or observed. Will continue to monitor.

## 2022-08-19 NOTE — DISCHARGE SUMMARY
Giovany Garrett MRN# [id number]   Age: 31 year old YOB: 1990        DISCHARGE  DX  Opiate use disorder severe  Methamphetamine use disorder severe           Event Leading to Hospitalization:     See Admission note by admitting provider for patient encounter. for additional details.          Hospital Course:   PATIENT was [facility]  under DR vu, please review the detailed admit note on 8/17/2022   The patient was placed under status 15 (15 minute checks) to ensure patient safety.   Patient did not want to take Suboxone methadone for detox from opiates  He had symptomatic detox from methamphetamine  He reported that he was taking Xanax but then reported he was not using any benzos regularly  His U tox was negative  He did not need any detox from benzodiazepines as he was not taking any benzodiazepines    PATIENTdid participate in groups and was not visible in the milieu.     The patient's symptoms of withdrawal improved.     Patients energy motivation , sleep appetite improved.  Pt completed detox . It was un eventful.    Robaxin ordered for muscle cramps  Discussed with patient medications for craving.  Spoke with patient about triggers coping skills relapse prevention.    CONSULTS DONE DURING PATIENTS HOSPITALIZATION.  Patient was seen by medicine on date 8/17/2020    This as per their medical consult    Assessment and Plan/Recommendations:      Giovany Garrett is a 31 year old male with PMHx significant for bicuspid aortic valve and polysubstane abuse with meth, fentanyl and benzodiazapines.  He presented seeking detox prior to going to a treatment facility. Medicine was consulted for co management. The patient's only home medication is nicorette gum      # Polysubstance abuse - Benzodiazepines, methamphetamine, and opioids (fentanyl)  MSSA 3-5 this shift.  Denies hx of withdrawal seizures. Pt denies alcohol use. Utox positive for amphetamine on arrival. Fentanyl is  known to not appear of drug screens for opioids.   - Continue MSSA   - Further management per Psychiatry      # SOB   # Hx of aortic bicuspid valve   Reported in the ED. CXR was unremarkable. He reports smoking his methamphetamine and I suspect this is playing a contributing role. He has stable vitals and normal WBC.  - Denies use of IV drugs at any time.   - If develops fevers and s/sx of SIRS would recommend obtaining CBC, CRP, ESR and echocardiogram given increased risk of endocarditis with his aortic bicuspid valve in setting of polysubstance abuse.  -  Contact medicine with any concern for SIRS.  - Recommend obtaining echocardiogram as an outpatient for follow up on bicuspid aortic valve given 2-3/6 systolic murmur on exam      # Tobacco abuse   - nicotine replacement per psychiatry      Currently, medically stable and internal medicine will sign off. Please contact if future questions or concerns arise. Thank you for the opportunity to be a part of this patient's care.                         Labs:reviewed with patient       No results found for this or any previous visit (from the past 48 hour(s)).      Recent Results (from the past 240 hour(s))   Drug abuse screen 1 urine (ED)    Collection Time: 08/16/22  3:48 PM   Result Value Ref Range    Amphetamines Urine Screen Positive (A) Screen Negative    Barbiturates Urine Screen Negative Screen Negative    Benzodiazepines Urine Screen Negative Screen Negative    Cannabinoids Urine Screen Negative Screen Negative    Cocaine Urine Screen Negative Screen Negative    Opiates Urine Screen Negative Screen Negative   Comprehensive metabolic panel    Collection Time: 08/16/22  3:57 PM   Result Value Ref Range    Sodium 139 133 - 144 mmol/L    Potassium 4.3 3.4 - 5.3 mmol/L    Chloride 105 94 - 109 mmol/L    Carbon Dioxide (CO2) 30 20 - 32 mmol/L    Anion Gap 4 3 - 14 mmol/L    Urea Nitrogen 13 7 - 30 mg/dL    Creatinine 0.86 0.66 - 1.25 mg/dL    Calcium 9.3 8.5 - 10.1  mg/dL    Glucose 101 (H) 70 - 99 mg/dL    Alkaline Phosphatase 111 40 - 150 U/L    AST 16 0 - 45 U/L    ALT 26 0 - 70 U/L    Protein Total 7.3 6.8 - 8.8 g/dL    Albumin 4.0 3.4 - 5.0 g/dL    Bilirubin Total 0.3 0.2 - 1.3 mg/dL    GFR Estimate >90 >60 mL/min/1.73m2   CBC with platelets and differential    Collection Time: 08/16/22  3:57 PM   Result Value Ref Range    WBC Count 7.0 4.0 - 11.0 10e3/uL    RBC Count 4.74 4.40 - 5.90 10e6/uL    Hemoglobin 14.0 13.3 - 17.7 g/dL    Hematocrit 40.3 40.0 - 53.0 %    MCV 85 78 - 100 fL    MCH 29.5 26.5 - 33.0 pg    MCHC 34.7 31.5 - 36.5 g/dL    RDW 11.7 10.0 - 15.0 %    Platelet Count 284 150 - 450 10e3/uL    % Neutrophils 61 %    % Lymphocytes 28 %    % Monocytes 7 %    % Eosinophils 3 %    % Basophils 1 %    % Immature Granulocytes 0 %    NRBCs per 100 WBC 0 <1 /100    Absolute Neutrophils 4.2 1.6 - 8.3 10e3/uL    Absolute Lymphocytes 2.0 0.8 - 5.3 10e3/uL    Absolute Monocytes 0.5 0.0 - 1.3 10e3/uL    Absolute Eosinophils 0.2 0.0 - 0.7 10e3/uL    Absolute Basophils 0.1 0.0 - 0.2 10e3/uL    Absolute Immature Granulocytes 0.0 <=0.4 10e3/uL    Absolute NRBCs 0.0 10e3/uL   Asymptomatic COVID-19 Virus (Coronavirus) by PCR Nose    Collection Time: 08/16/22  3:58 PM    Specimen: Nose; Swab   Result Value Ref Range    SARS CoV2 PCR Negative Negative   Lipid panel    Collection Time: 08/17/22  7:55 AM   Result Value Ref Range    Cholesterol 121 <200 mg/dL    Triglycerides 51 <150 mg/dL    Direct Measure HDL 42 >=40 mg/dL    LDL Cholesterol Calculated 69 <=100 mg/dL    Non HDL Cholesterol 79 <130 mg/dL   TSH with free T4 reflex and/or T3 as indicated    Collection Time: 08/17/22  7:55 AM   Result Value Ref Range    TSH 0.75 0.40 - 4.00 mU/L   Hemoglobin A1c    Collection Time: 08/17/22  7:55 AM   Result Value Ref Range    Hemoglobin A1C 5.2 0.0 - 5.6 %            Because this patient meets criteria for an Alcohol Use Disorder, I performed the following brief intervention on the date  of this note:              1) Expressed concern that the patient is drinking at unhealthy levels known to increase their risk of alcohol related problems              2) Gave feedback linking alcohol use and health, including personalized feedback explaining how alcohol use can interact with their medical and/or psychiatric problems, and with prescribed medications.              3) Advised patient to abstain.    PT counseled on nicotine cessation and nicotine replacement provided    Discussed with patient many issues of addiction,triggers, relapse, and establishing a solid recovery program.    DISCHARGE MENTAL STATUS EXAMINATION:  The patient is alert, oriented x3.  Good fund of knowledge.  Good use of language.  Recent and remote memory, language, fund of knowledge are all adequate.  Euthymic mood congruent affect  Speech normal rate/rhythm linear tp no loose asso,The patient does not have any active suicidal or homicidal ideation.  Does not have any auditory or visual hallucination.  Fair insight/judgment At this time, the patient was stable to be discharged.        Pt was not determined to not be a danger to himself or others. At the current time of discharge, the patient does not meet criteria for involuntary hospitalization. On the day of discharge, the patient reports that they do not have suicidal or homicidal ideation and would never hurt themselves or others. Steps taken to minimize risk include: assessing patient s behavior and thought process daily during hospital stay, discharging patient with adequate plan for follow up for mental and physical health and discussing safety plan of returning to the hospital should the patient ever have thoughts of harming themselves or others. Therefore, based on all available evidence including the factors cited above, the patient does not appear to be at imminent risk for self-harm, and is appropriate for outpatient level of care.     Educated about side effects/risk vs  "benefits /alternative including non treatment.Pt consented to be on medication.     .Total time spent on discharge summary more than 35 min  More than  20 min  planning, coordination of care, medication reconciliation and performance of physical exam on day of discharge.Care was coordinated with unit RN and unit therapist       Review of your medicines      START taking      Dose / Directions   cloNIDine 0.1 MG tablet  Commonly known as: CATAPRES  Used for: Chemical dependency (H)      Dose: 0.1 mg  Take 1 tablet (0.1 mg) by mouth nightly as needed (chills, sweating, opioid withdrawal symptoms.)  Quantity: 7 tablet  Refills: 0     naloxone 4 MG/0.1ML nasal spray  Commonly known as: NARCAN  Used for: Chemical dependency (H)      Dose: 4 mg  Spray 1 spray (4 mg) into one nostril alternating nostrils as needed every 2-3 minutes until assistance arrives  Quantity: 0.2 mL  Refills: 1        STOP taking    nicotine polacrilex 4 MG gum  Commonly known as: NICORETTE              Where to get your medicines      These medications were sent to [facility] [address]  [address] [address]    Phone: [phone]     cloNIDine 0.1 MG tablet    naloxone 4 MG/0.1ML nasal spray         Disposition: Patient going back to [facility] treatment tomorrow     Facts about COVID19 at www.cdc.gov/COVID19 and www.[place].gov/covid19     Keeping hands clean is one of the most important steps we can take to avoid getting sick and spreading germs to others.  Please wash your hands frequently and lather with soap for at least 20 seconds!             \"Much or all of the text in this note was generated through the use of Dragon Dictate voice to text software. Errors in spelling or words which appear to be out of contact are unintentional, may be present due having escaped editing\"     "

## 2022-08-19 NOTE — PROGRESS NOTES
"Patient discharged at 0900 to [facility] per Dr. Baumann order. Patient picked up by [facility] staff. All patient belongings from room, locker, and security sent with patient. Patient escorted off the unit by Behavioral Assistant, Brandon.     Patient refused to go over discharge instructions, teachings, patient's labs, and unit recommendations with this RN. Patient offered x 2 to go through paperwork, patient declined and stated, \"I'll just read this on my own.\" Patient provided AVS to read. Patient AVS signature sheet marked as refused.     This RN went over patient's prescribed medications being sent with patient from the discharge pharmacy. Patient verbalizes understanding of medication instructions and indications. Patient denies thoughts of harm towards self or others. Patient denies any suicidal ideation, plan or intent. Patient denies auditory/visual hallucinations. Pt denies any current medication side effects or medical issues at this time.     Patient was encouraged to make a follow-up appointment with Primary Care Provider within one week of discharge, patient declined to do so while on the unit.     Prior to discharge, patient remained agitated/irritable. Patient denies any further questions and is now discharged at this time.   "

## 2022-08-19 NOTE — PROGRESS NOTES
Patient presents as agitated this am, irritable. Patient requesting to stay through the weekend. Patient provider, Dr. Baumann, notified.